# Patient Record
Sex: MALE | Race: BLACK OR AFRICAN AMERICAN | NOT HISPANIC OR LATINO | Employment: UNEMPLOYED | URBAN - METROPOLITAN AREA
[De-identification: names, ages, dates, MRNs, and addresses within clinical notes are randomized per-mention and may not be internally consistent; named-entity substitution may affect disease eponyms.]

---
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
## 2020-04-10 ENCOUNTER — APPOINTMENT (OUTPATIENT)
Dept: GENERAL RADIOLOGY | Facility: CLINIC | Age: 20
End: 2020-04-10
Attending: PHYSICIAN ASSISTANT

## 2020-04-10 ENCOUNTER — HOSPITAL ENCOUNTER (EMERGENCY)
Facility: CLINIC | Age: 20
Discharge: HOME | End: 2020-04-10
Attending: PHYSICIAN ASSISTANT | Admitting: PHYSICIAN ASSISTANT

## 2020-04-10 VITALS
BODY MASS INDEX: 29.35 KG/M2 | OXYGEN SATURATION: 100 % | DIASTOLIC BLOOD PRESSURE: 67 MMHG | RESPIRATION RATE: 18 BRPM | WEIGHT: 205 LBS | HEART RATE: 80 BPM | TEMPERATURE: 98.3 F | SYSTOLIC BLOOD PRESSURE: 124 MMHG | HEIGHT: 70 IN

## 2020-04-10 DIAGNOSIS — J98.01 BRONCHOSPASM: ICD-10-CM

## 2020-04-10 PROCEDURE — 71045 X-RAY EXAM CHEST 1 VIEW: CPT

## 2020-04-10 PROCEDURE — 99283 EMERGENCY DEPT VISIT LOW MDM: CPT | Mod: 25

## 2020-04-10 RX ORDER — ALBUTEROL SULFATE 90 UG/1
2 AEROSOL, METERED RESPIRATORY (INHALATION) EVERY 4 HOURS PRN
Qty: 1 INHALER | Refills: 0 | Status: ON HOLD | OUTPATIENT
Start: 2020-04-10 | End: 2024-07-20

## 2020-04-10 RX ORDER — PREDNISONE 20 MG/1
40 TABLET ORAL DAILY
Qty: 10 TABLET | Refills: 0 | Status: SHIPPED | OUTPATIENT
Start: 2020-04-10 | End: 2020-04-15

## 2020-04-10 ASSESSMENT — ENCOUNTER SYMPTOMS
PALPITATIONS: 0
CHILLS: 0
SORE THROAT: 0
WHEEZING: 1
FEVER: 0
VOICE CHANGE: 0
COUGH: 1
SHORTNESS OF BREATH: 1

## 2020-04-10 ASSESSMENT — MIFFLIN-ST. JEOR: SCORE: 1946.12

## 2020-04-10 NOTE — ED AVS SNAPSHOT
[facility] Emergency Department  [address]  [facility] [address]  Phone:  [phone]  Fax:  [phone]                                    Derik Croft   MRN: [id number]    Department:  [facility] Emergency Department   Date of Visit:  4/10/2020           After Visit Summary Signature Page    I have received my discharge instructions, and my questions have been answered. I have discussed any challenges I see with this plan with the nurse or doctor.    ..........................................................................................................................................  Patient/Patient Representative Signature      ..........................................................................................................................................  Patient Representative Print Name and Relationship to Patient    ..................................................               ................................................  Date                                   Time    ..........................................................................................................................................  Reviewed by Signature/Title    ...................................................              ..............................................  Date                                               Time          22EPIC Rev 08/18

## 2020-04-11 NOTE — ED PROVIDER NOTES
History     Chief Complaint:  Respiratory Problems      HPI   Derik Croft is a 20 year old male who presents with shortness of breath.  The patient states intermittent episodes of wheezing and difficulty breathing for the last week.  He states that these episodes last for several minute and appear to be somewhat intermittent.  He has been using his mother's albuterol inhaler which does seem to resolve the symptoms.  He notes that he has had a nonproductive cough for over a month.  Denies hemoptysis or recent international travel.  No fever or chills.  No sore throat, headache, or neck stiffness.  No known CO VID 19+ exposures.  He denies a history of asthma, but notes that he does smoke tobacco approximately 5 cigarettes daily.  Denies vaping.  No chest pain, leg swelling.  No vomiting or diarrhea.  He has not had any rashes, tongue or lip swelling, and has no history of allergic or anaphylactic reactions.  The episodes do not seem to come in relation to any significant trigger.    Allergies:  No Known Drug Allergies    Medications:    Adderall     Past Medical History:    Cerebral Palsy  Anxiety  Poor Coordination  ADHD  Mood Disorder  Vitamin D Deficiency  Obesity  Hearing Loss    Past Surgical History:    Tongue Cyst    Family History:    History reviewed. No pertinent family history.    Social History:  The patient was not accompanied to the ED  Smoking Status: Never  Smokeless Tobacco: Never  Alcohol Use: No  Marital Status:  Single    Review of Systems   Constitutional: Negative for chills and fever.   HENT: Negative for sore throat and voice change.    Respiratory: Positive for cough, shortness of breath and wheezing.    Cardiovascular: Negative for chest pain, palpitations and leg swelling.   All other systems reviewed and are negative.    Physical Exam     Patient Vitals for the past 24 hrs:   BP Temp Temp src Pulse Heart Rate Resp SpO2 Height Weight   04/10/20 2230 -- -- -- -- -- -- 100 % -- --  "  04/10/20 2215 124/67 -- -- 80 -- -- 100 % -- --   04/10/20 2200 123/72 -- -- 80 -- -- 100 % -- --   04/10/20 2145 -- -- -- 80 -- -- 99 % -- --   04/10/20 2126 (!) 141/89 98.3  F (36.8  C) Oral -- 105 18 98 % 1.778 m (5' 10\") 93 kg (205 lb)       Physical Exam  General: Alert and cooperative with exam. Resting comfortably on gurney  Head:  Scalp is NC/AT  Eyes:  No scleral icterus, PERRL  ENT:  The external nose and ears are normal.     The oropharynx is normal and without erythema or tonsillar swelling. Uvula midline, no submandibular swelling, sublingual swelling, trismus.  No angioedema of lips, tongue, or uvula.  Neck:  Normal range of motion without rigidity.  CV:  Regular rate and rhythm    No pathologic murmur, rubs, or gallops.  Resp:  Breath sounds are clear bilaterally.  No crackles, wheezes, rhonchi, stridor.    Non-labored, no retractions or accessory muscle use  GI:  Abdomen is soft, no distension, no tenderness, no masses. No peritoneal signs.  Bowel sounds present in all quadrants  MS:  No lower extremity edema or asymmetric calf swelling.  Skin:  Warm and dry, No rash or lesions noted.  Neuro: Oriented. No gross motor deficits.    GCS: 15.   Lymph: No cervical lymphadenopathy  Psych: Awake. Alert. Normal affect. Appropriate interactions.      Emergency Department Course   Imaging:  Radiology findings were communicated with the patient who voiced understanding of the findings.    XR Chest Port 1 View:  Negative chest.  Report per radiology     Procedures  None.    Emergency Department Course:  Past medical records, nursing notes, and vitals reviewed.    2134: I performed an exam of the patient as documented above.     The patient had a Chest XRay while in the emergency department, results above.     2225: I rechecked the patient and discussed the results of his workup thus far.     Findings and plan explained to the Patient. Patient discharged home with instructions regarding supportive care, " medications, and reasons to return. The importance of close follow-up was reviewed. The patient was prescribed Albuterol and Deltasone.    I personally reviewed the imaging results with the Patient and answered all related questions prior to discharge.     Impression & Plan   Medical Decision Makin-year-old male presents with intermittent episodes of shortness of breath and wheezing.  History and records reviewed.  Chest x-ray clear with no evidence of pneumonia, pneumothorax, or other acute abnormality.  His symptoms are most consistent with intermittent bronchospasm.   Likely  allergic versus post viral in nature and aggravated by tobacco use.   Vocal cord dysfunction and possibility but less likely.  No evidence of deep space infection of the head or neck.  Denies other symptoms to suggest anaphylactic reaction, and is asymptomatic at present.  Symptoms not consistent with CO VID 19 infection.  Very low suspicion for other cardiac or pulmonary etiologies such as ACS, PE, CHF, dissection etc.    Will prescribe albuterol, prednisone, urged tobacco cessation.  Follow-up with primary care provider if not improving as expected.  Strict return precautions given for increased difficulty breathing, development of swelling of the tongue or lips, neck stiffness, high fevers etc.    Diagnosis:    ICD-10-CM    1. Bronchospasm  J98.01        Disposition:  Discharged to home.    Discharge Medications:  New Prescriptions    ALBUTEROL (PROAIR HFA) 108 (90 BASE) MCG/ACT INHALER    Inhale 2 puffs into the lungs every 4 hours as needed for shortness of breath / dyspnea    PREDNISONE (DELTASONE) 20 MG TABLET    Take 2 tablets (40 mg) by mouth daily for 5 days       Scribe Disclosure:  INirali, am serving as a scribe at 9:34 PM on 4/10/2020 to document services personally performed by Henry Escalante, based on my observations and the provider's statements to me.     IAndreea, am serving as a scribe at  9:37 PM on 4/10/2020 to document services personally performed by Henry Escalante, based on my observations and the provider's statements to me.       Nirali Adam  4/10/2020   [facility] EMERGENCY DEPARTMENT       Henry Escalante PA-C  04/10/20 4800

## 2020-04-11 NOTE — ED TRIAGE NOTES
Pt presents for evaluation of difficulty swallowing, wheezing and difficulty breathing that has been intermittent for the last week. Pt feels like is throat is closing when it happens, like he cant breathe. Pt cant relate anything similar with every episode. Last episode happened 10 minutes PTA.

## 2020-04-15 ENCOUNTER — COMMUNICATION - HEALTHEAST (OUTPATIENT)
Dept: FAMILY MEDICINE | Facility: CLINIC | Age: 20
End: 2020-04-15

## 2020-04-15 DIAGNOSIS — J98.01 BRONCHOSPASM: ICD-10-CM

## 2020-04-20 ENCOUNTER — AMBULATORY - HEALTHEAST (OUTPATIENT)
Dept: FAMILY MEDICINE | Facility: CLINIC | Age: 20
End: 2020-04-20

## 2020-04-20 ENCOUNTER — COMMUNICATION - HEALTHEAST (OUTPATIENT)
Dept: FAMILY MEDICINE | Facility: CLINIC | Age: 20
End: 2020-04-20

## 2020-12-31 ENCOUNTER — OFFICE VISIT - HEALTHEAST (OUTPATIENT)
Dept: FAMILY MEDICINE | Facility: CLINIC | Age: 20
End: 2020-12-31

## 2020-12-31 DIAGNOSIS — F90.0 ATTENTION DEFICIT HYPERACTIVITY DISORDER (ADHD), PREDOMINANTLY INATTENTIVE TYPE: ICD-10-CM

## 2020-12-31 ASSESSMENT — ANXIETY QUESTIONNAIRES
GAD7 TOTAL SCORE: 0
4. TROUBLE RELAXING: NOT AT ALL
5. BEING SO RESTLESS THAT IT IS HARD TO SIT STILL: NOT AT ALL
6. BECOMING EASILY ANNOYED OR IRRITABLE: NOT AT ALL
7. FEELING AFRAID AS IF SOMETHING AWFUL MIGHT HAPPEN: NOT AT ALL
2. NOT BEING ABLE TO STOP OR CONTROL WORRYING: NOT AT ALL
3. WORRYING TOO MUCH ABOUT DIFFERENT THINGS: NOT AT ALL
IF YOU CHECKED OFF ANY PROBLEMS ON THIS QUESTIONNAIRE, HOW DIFFICULT HAVE THESE PROBLEMS MADE IT FOR YOU TO DO YOUR WORK, TAKE CARE OF THINGS AT HOME, OR GET ALONG WITH OTHER PEOPLE: NOT DIFFICULT AT ALL
1. FEELING NERVOUS, ANXIOUS, OR ON EDGE: NOT AT ALL

## 2020-12-31 ASSESSMENT — PATIENT HEALTH QUESTIONNAIRE - PHQ9: SUM OF ALL RESPONSES TO PHQ QUESTIONS 1-9: 1

## 2021-01-06 ENCOUNTER — COMMUNICATION - HEALTHEAST (OUTPATIENT)
Dept: FAMILY MEDICINE | Facility: CLINIC | Age: 21
End: 2021-01-06

## 2021-01-06 DIAGNOSIS — F90.2 ATTENTION DEFICIT HYPERACTIVITY DISORDER (ADHD), COMBINED TYPE: ICD-10-CM

## 2021-02-19 ENCOUNTER — COMMUNICATION - HEALTHEAST (OUTPATIENT)
Dept: FAMILY MEDICINE | Facility: CLINIC | Age: 21
End: 2021-02-19

## 2021-02-19 DIAGNOSIS — F90.2 ATTENTION DEFICIT HYPERACTIVITY DISORDER (ADHD), COMBINED TYPE: ICD-10-CM

## 2021-05-27 ASSESSMENT — PATIENT HEALTH QUESTIONNAIRE - PHQ9: SUM OF ALL RESPONSES TO PHQ QUESTIONS 1-9: 1

## 2021-05-27 NOTE — TELEPHONE ENCOUNTER
Controlled Substance Refill Request  Medication Name:   Requested Prescriptions     Pending Prescriptions Disp Refills     dextroamphetamine-amphetamine (ADDERALL) 10 mg Tab tablet 30 tablet 0     Sig: Take 1 tablet by mouth daily.     Date Last Fill: 3/4/2019  Pharmacy: [facility] [place]      Submit electronically to pharmacy  Controlled Substance Agreement Date Scanned:   Encounter-Level CSA Scan Date:    There are no encounter-level csa scan date.       Last office visit with prescriber/PCP: 3/4/2019 Abdulaziz Rodriguez MD OR same dept: 3/4/2019 Abdulaziz Rodriguez MD OR same specialty: 3/4/2019 Abdulaziz Rodriguez MD  Last physical: Visit date not found Last MTM visit: Visit date not found

## 2021-05-28 ASSESSMENT — ANXIETY QUESTIONNAIRES: GAD7 TOTAL SCORE: 0

## 2021-05-28 NOTE — PROGRESS NOTES
Chief Complaint   Patient presents with     Rash     Started last weekend. Very itchy. Mostly on arms and legs. Little on torso         HPI      Patient is here for about a week of moderate to severe itching rash on arms and legs, minimally on torso. No recent unusual contacts nor exposures. No fever, chills.    ROS: Pertinent ROS noted in HPI.     No Known Allergies    Patient Active Problem List   Diagnosis     Cerebral Palsy     Anxiety     Poor Coordination     ADHD (attention deficit hyperactivity disorder)     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     Vitamin D Deficiency       Family History   Problem Relation Age of Onset     Hyperthyroidism Mother      Hypothyroidism Maternal Grandmother      Hypertension Paternal Grandmother      Alcohol abuse Paternal Uncle        Social History     Socioeconomic History     Marital status: Single     Spouse name: Not on file     Number of children: Not on file     Years of education: Not on file     Highest education level: Not on file   Occupational History     Not on file   Social Needs     Financial resource strain: Not on file     Food insecurity:     Worry: Not on file     Inability: Not on file     Transportation needs:     Medical: Not on file     Non-medical: Not on file   Tobacco Use     Smoking status: Never Smoker     Smokeless tobacco: Never Used   Substance and Sexual Activity     Alcohol use: No     Drug use: No     Sexual activity: Never   Lifestyle     Physical activity:     Days per week: Not on file     Minutes per session: Not on file     Stress: Not on file   Relationships     Social connections:     Talks on phone: Not on file     Gets together: Not on file     Attends Religion service: Not on file     Active member of club or organization: Not on file     Attends meetings of clubs or organizations: Not on file     Relationship status: Not on file     Intimate partner violence:     Fear of current or ex partner: Not on file     Emotionally  abused: Not on file     Physically abused: Not on file     Forced sexual activity: Not on file   Other Topics Concern     Not on file   Social History Narrative     Not on file         Objective:    Vitals:    04/27/19 1257   BP: 112/64   Pulse: 80   Resp: 14   Temp: 98  F (36.7  C)   SpO2: 100%       Gen:NAD  CV: RRR  Pulm: CTAB  Skin:scattered papular eruptions on arms, hands. More intense similar lesions on thighs. No pustules no vesicles.         Rash and nonspecific skin eruption  -     permethrin (ELIMITE) 5 % cream; Massage cream from head to toes, leave on for 10 hours, then wash off with shower for one time treatment.  -     methylPREDNISolone (MEDROL DOSEPACK) 4 mg tablet; Follow package directions    Rash of unclear etiology. Will start one time Permethrin to cover scabies, and Medrol for intense itching. Advised f/u with primary care in a few days if no improvement.
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
## 2021-05-30 NOTE — TELEPHONE ENCOUNTER
Patient reported he has been out of his medications for one week.      Controlled Substance Refill Request  Medication Name:   Requested Prescriptions     Pending Prescriptions Disp Refills     dextroamphetamine-amphetamine (ADDERALL XR) 20 MG 24 hr capsule 30 capsule 0     Sig: Take 1 capsule (20 mg total) by mouth daily.     dextroamphetamine-amphetamine (ADDERALL) 10 mg Tab tablet 30 tablet 0     Sig: Take 1 tablet by mouth daily.     Date Last Fill:   Dextroamphetamine-amphetamine 20 mg 24 hr Capsule: 4/29/2019  Dextroamphetamine-amphetamine 10 mg Tablet: 4/11/2019  Pharmacy: [facility] [place]      Submit electronically to pharmacy  Controlled Substance Agreement Date Scanned:   Encounter-Level CSA Scan Date:    There are no encounter-level csa scan date.       Last office visit with prescriber/PCP: 4/29/2019 Abdulaziz Rodriguez MD OR same dept: 6/10/2019 Debbie Saravia CNP OR same specialty: 6/10/2019 Debbie Saravia CNP  Last physical: Visit date not found Last MTM visit: Visit date not found

## 2021-05-31 VITALS — BODY MASS INDEX: 29.12 KG/M2 | HEIGHT: 70 IN | WEIGHT: 203.4 LBS

## 2021-05-31 VITALS — WEIGHT: 227 LBS | BODY MASS INDEX: 33.04 KG/M2

## 2021-05-31 VITALS — BODY MASS INDEX: 29.77 KG/M2 | WEIGHT: 201 LBS | HEIGHT: 69 IN

## 2021-05-31 VITALS — WEIGHT: 230 LBS | BODY MASS INDEX: 33.48 KG/M2

## 2021-06-01 VITALS — WEIGHT: 221.1 LBS | BODY MASS INDEX: 32.18 KG/M2

## 2021-06-02 ENCOUNTER — RECORDS - HEALTHEAST (OUTPATIENT)
Dept: ADMINISTRATIVE | Facility: CLINIC | Age: 21
End: 2021-06-02

## 2021-06-02 VITALS — BODY MASS INDEX: 30.96 KG/M2 | WEIGHT: 212.7 LBS

## 2021-06-02 NOTE — TELEPHONE ENCOUNTER
Patient stated he would like his prescriptions sent to [facility] in [facility].    Controlled Substance Refill Request  Medication Name:   Requested Prescriptions     Pending Prescriptions Disp Refills     dextroamphetamine-amphetamine (ADDERALL XR) 20 MG 24 hr capsule 30 capsule 0     Sig: Take 1 capsule (20 mg total) by mouth daily.     Date Last Fill: 7/23/19  Pharmacy: [facility] [place]      Submit electronically to pharmacy  Controlled Substance Agreement Date Scanned:   Encounter-Level CSA Scan Date:    There are no encounter-level csa scan date.       Last office visit with prescriber/PCP: 4/29/2019 Abdulaziz Rodriguez MD OR same dept: 6/10/2019 Debbie Saravia CNP OR same specialty: 6/10/2019 Debbie Saravia CNP  Last physical: Visit date not found Last [facility] visit: Visit date not found

## 2021-06-03 VITALS — WEIGHT: 205.3 LBS | BODY MASS INDEX: 30.52 KG/M2 | BODY MASS INDEX: 29.88 KG/M2 | HEIGHT: 70 IN | WEIGHT: 213.2 LBS

## 2021-06-03 VITALS — HEIGHT: 70 IN | WEIGHT: 201.5 LBS | BODY MASS INDEX: 28.85 KG/M2

## 2021-06-03 NOTE — TELEPHONE ENCOUNTER
Medication Request  Medication name:  Permethrin -  creme  Pharmacy Name and Location: [id number] - [place], [place] - [address] AT [facility] [address]  [phone]  Reason for request:  Pt rash on shoulders are starting to come back    When did you use medication last ?: 2 months ago   Okay to leave a detailed message: yes         Summary: Massage cream from head to toes, leave on for 10 hours, then wash off with shower for one time treatment., Normal   Start: 4/27/2019  End: 6/10/2019  Ord/Sold: 4/27/2019 (O)  Report  Adh:   Long-term:   Pharmacy: [id number] [place], [place] - [address] AT [facility] [address]  Med Dose History       Patient Sig: Massage cream from head to toes, leave on for 10 hours, then wash off with shower for one time treatment.       Ordered on: 4/27/2019

## 2021-06-03 NOTE — TELEPHONE ENCOUNTER
Controlled Substance Refill Request  Medication:   Requested Prescriptions     Pending Prescriptions Disp Refills     dextroamphetamine-amphetamine (ADDERALL XR) 20 MG 24 hr capsule 30 capsule 0     Sig: Take 1 capsule (20 mg total) by mouth daily.     dextroamphetamine-amphetamine (ADDERALL) 10 mg Tab tablet 30 tablet 0     Sig: Take 1 tablet by mouth daily.     Date Last Fill: 10/17/19  Pharmacy:  [id number] [place] [address] AT [facility] [address]  [phone]      Submit electronically to pharmacy  Controlled Substance Agreement on File:   Encounter-Level CSA Scan Date:    There are no encounter-level csa scan date.       Last office visit: 4/29/2019 Abdulaziz Rodriguez MD

## 2021-06-07 NOTE — TELEPHONE ENCOUNTER
Who is calling:  Patient  Reason for Call:  Pt was seen in the ER Friday and was diagnosed with Bronchospasms.  Pt was given 10 tablets of Prednisone, and he took his last one today.  Pt has an upcoming appointment on Monday at 2 pm with his primary, but would like more pills due to still experiencing the spasms.  Pharmacy on file.  Please advise.  Date of last appointment with primary care: N/A  Okay to leave a detailed message: Yes

## 2021-06-11 NOTE — PROGRESS NOTES
Assessment/Plan:     1. Attention deficit hyperactivity disorder (ADHD), combined type  2. Anxiety  We will start low dose of medication risks benefits possible side effects discussed.  Continue to follow with therapist.  Recommend following up in 1 month with primary care provider for evaluation dose adjustment if needed.  Patient was seen today without his mother and so I did call her and get her consent to start treatment.  - dextroamphetamine-amphetamine (ADDERALL) 10 mg Tab tablet; Take 1 tablet by mouth daily.  Dispense: 30 tablet; Refill: 0    3. Dermatitis  On sure of cause a rash will try fungal cream as below call or return to care symptoms worsen or do not improve.  - ketoconazole (NIZORAL) 2 % cream; Apply topically 2 (two) times a day.  Dispense: 30 g; Refill: 1      Subjective:      Derik Croft is a 17 y.o. male comes in today requesting prescription for Adderall.  We had discussed at last visit.  He took as a child but has been off for several years.  More recently has been experimenting with his friends medication.  States he is unsure of the dose, perhaps 50 mg?  States he tolerated it well.  At last visit we discussed other alternative medications he was considering as his mother was against stimulants but states that he talked with her and at this point they are in agreement to restart.  He will continue to follow with counselor.  States anxiety is under control.  Also has a rash that has been on his neck for many years not spreading or changing but wants to see if there is anything he can do to make it go away.  No other new concerns today    Current Outpatient Prescriptions   Medication Sig Dispense Refill     dextroamphetamine-amphetamine (ADDERALL) 10 mg Tab tablet Take 1 tablet by mouth daily. 30 tablet 0     ketoconazole (NIZORAL) 2 % cream Apply topically 2 (two) times a day. 30 g 1     No current facility-administered medications for this visit.      No Known Allergies  Past Medical  "History, Family History, and Social History reviewed.  Past Medical History:   Diagnosis Date     ADHD (attention deficit hyperactivity disorder)      Anxiety      Cerebral palsy      Past Surgical History:   Procedure Laterality Date     tongue cyst       Review of patient's allergies indicates no known allergies.  Family History   Problem Relation Age of Onset     Hyperthyroidism Mother      Hypothyroidism Maternal Grandmother      Hypertension Paternal Grandmother      Alcohol abuse Paternal Uncle      Social History     Social History     Marital status: Single     Spouse name: N/A     Number of children: N/A     Years of education: N/A     Occupational History     Not on file.     Social History Main Topics     Smoking status: Never Smoker     Smokeless tobacco: Not on file     Alcohol use No     Drug use: No     Sexual activity: No     Other Topics Concern     Not on file     Social History Narrative         Review of systems is as stated in HPI, and the remainder of the 10 system review is otherwise negative.    Objective:     Vitals:    07/12/17 1016   BP: 110/60   Patient Site: Right Arm   Patient Position: Sitting   Cuff Size: Adult Large   Pulse: 88   Weight: 203 lb 6.4 oz (92.3 kg)   Height: 5' 9.5\" (1.765 m)    Body mass index is 29.61 kg/(m^2).  Wt Readings from Last 3 Encounters:   07/12/17 203 lb 6.4 oz (92.3 kg) (96 %, Z= 1.74)*   06/13/17 201 lb (91.2 kg) (96 %, Z= 1.70)*   08/03/16 195 lb (88.5 kg) (96 %, Z= 1.75)*     * Growth percentiles are based on CDC 2-20 Years data.       General Appearance:    Alert, cooperative, no distress, appears stated age    Head:    Normocephalic, without obvious abnormality, atraumatic   Eyes:    PERRL,no conjunctivitis    Ears:    Normal  external ear canals   Nose:   Mucosa normal, no drainage        Throat:   Oropharynx is clear   Neck:   Supple, symmetrical, no adenopathy, no thyromegally, no carotid bruit        Lungs:     Clear to auscultation bilaterally, " respirations unlabored   Chest Wall:    No tenderness or deformity    Heart:    Regular rate and rhythm, S1 and S2 normal, no murmur, rub    or gallop       Abdomen:     Soft, non-tender, bowel sounds active all four quadrants,     no masses, no organomegaly           Extremities:   Extremities normal, atraumatic, no cyanosis or edema   Pulses:   2+ and symmetric all extremities   Neuro:   cranial nerves grossly intact, grossly normal sensation and reflexes in extremities    Psych:   grossly normal mood and affect without acute anxiety or psychosis    Skin:  On top of shoulders there are several scattered areas of hyperpigmentation slightly rough no open lesions.  Otherwise no rashes or lesions   :          This note has been dictated using voice recognition software. Any grammatical or context distortions are unintentional and inherent to the software.

## 2021-06-11 NOTE — PROGRESS NOTES
Assessment/Plan:     1. Screen for STD (sexually transmitted disease)  Screening ordered as below.  Patient currently asymptomatic.  Recommended continued safe sex practices  - Chlamydia trachomatis & Neisseria gonorrhoeae, Amplified Detection  - HIV Antigen/Antibody Screening [place]  - Syphilis Screen, [place]    2. Anxiety  3. Attention deficit hyperactivity disorder (ADHD), combined type  [place] various options.  Patient will continue to follow with psychology.  States mother is against him taking medications at this point.  Was given information about a few medications.  He is interested in looking at something like Wellbutrin possibly buspirone.  He will discuss with her and let us know if they change their mind and plan to send in prescription.      Total time spent was 45 minutes, >50% spent discussing treatment options noted above, counseling and coordination of care.     Subjective:      Derik Croft is a 17 y.o. male comes in today with a few concerns.  First he would like STD screening.  He had new sexual partners did use protection but just wants to make sure that he does not have any pain.  He is particularly concerned about HIV.  He has no itching burning discharge or other concerns.  He also wants to discuss medications for mental health.  He has a history of being diagnosed as ADHD as well as anxiety and depression.  He tells me that he sees a therapist and they told him that perhaps he should be on medication for depression.  He is actually in a good mood today states that his moods have increased over the last couple days and school was out.  He states that he typically does very well in school but that last trimester he failed nearly all of his classes and has to go to summer school.  He states he just was not motivated to study.  No significant changes in his life to cause the change in motivation.  He states that he is very anxious about the future that he will get into college.  He states  that he previously took Adderall when he was younger but his mom does not want him on any medications particularly not on a stimulant like Adderall.  He states that he did try Adderall from his friend for a week and it did seem to help but he would like to stay off of that medication if possible wonders if there is something that can sometimes help with a few of his symptoms.  His mom is not in the room with him but she did consent us to see him alone and she is sitting in the lobby.  He states he does not know if she will allow him to be on any medications and he wants to follow her wishes as he is still 17 but he wants to take some literature home to discuss with her.  No other new concerns today.    No current outpatient prescriptions on file.     No current facility-administered medications for this visit.        Past Medical History, Family History, and Social History reviewed.  Past Medical History:   Diagnosis Date     ADHD (attention deficit hyperactivity disorder)      Anxiety      Cerebral palsy      Past Surgical History:   Procedure Laterality Date     tongue cyst       Review of patient's allergies indicates no known allergies.  Family History   Problem Relation Age of Onset     Hyperthyroidism Mother      Hypothyroidism Maternal Grandmother      Hypertension Paternal Grandmother      Alcohol abuse Paternal Uncle      Social History     Social History     Marital status: Single     Spouse name: N/A     Number of children: N/A     Years of education: N/A     Occupational History     Not on file.     Social History Main Topics     Smoking status: Never Smoker     Smokeless tobacco: Not on file     Alcohol use No     Drug use: No     Sexual activity: No     Other Topics Concern     Not on file     Social History Narrative         Review of systems is as stated in HPI, and the remainder of the 10 system review is otherwise negative.    Objective:     Vitals:    06/13/17 1057   BP: 116/70   Pulse: 79   SpO2:  "99%   Weight: 201 lb (91.2 kg)   Height: 5' 9\" (1.753 m)    Body mass index is 29.68 kg/(m^2).    General Appearance:    Alert, cooperative, no distress, appears stated age   Head:    Normocephalic, without obvious abnormality, atraumatic   Eyes:    PERRL   Ears:    Normal external ear canals   Nose:   Mucosa normal, no drainage        Throat:   Oropharynx is clear   Neck:   Supple, symmetrical, no adenopathy                                   Extremities:   Extremities normal, atraumatic, no cyanosis or edema   Psych:  normal mood and affect without acute anxiety or psychosis   Skin:   No rashes or lesions         This note has been dictated using voice recognition software. Any grammatical or context distortions are unintentional and inherent to the the software.   "

## 2021-06-13 NOTE — PROGRESS NOTES
Assessment:     1. Viral URI     2. Urine frequency  Urinalysis-UC if Indicated   3. Fever  Rapid Strep A Screen-Throat    Influenza A/B Rapid Test    Group A Strep, RNA Direct Detection, Throat          Plan:   Discussed the typical course of a viral upper respiratory infection.  No antibiotics indicated at this time.  Recommend symptomatic treatment such as decongestants and acetominephen or ibuprofen as needed.  Recommend follow up if getting worse or not improving.    Does seem to be mildly dehydrated.  Recommend pushing fluids--Gatorade.    Unclear as to the etiology of his increased urinary frequency.  No evidence of infection.  Recommend that he push fluids and follow-up if his symptoms are getting worse or not continuing to improve.      Subjective:       17 y.o. male presents for evaluation of a four-day history of fatigue low-grade fever and sore throat.  Starting yesterday he started having some body aches and started coughing yesterday as well.  He has been having some nasal congestion in addition.  He is also been having some headaches off and on.  His appetite is been poor.  He missed school today.  He has not been short of breath.  He denies any abdominal pain.  His stools have been a bit more loose than usual and his mom is noticed that he has been going the bathroom more frequently.  He states he does not drink much water.  He has taken some allergy medication and Benadryl as well as Sudafed over the past week and this has not helped much for his symptoms.    The following portions of the patient's history were reviewed and updated as appropriate: allergies, current medications, past family history, past medical history, past social history, past surgical history and problem list.    Review of Systems  A 12 point comprehensive review of systems was negative except as noted.     Objective:      /64  Pulse 116  Temp 98.3  F (36.8  C) (Oral)   Resp 16  Wt (!) 230 lb (104.3 kg)  SpO2  96%  General appearance: alert, appears stated age and cooperative  Eyes: conjunctivae/corneas clear. PERRL, EOM's intact. Fundi benign.  Ears: normal TM's and external ear canals both ears  Throat: lips, mucosa, and tongue normal; teeth and gums normal, his membranes tacky.  Neck: no adenopathy  Lungs: clear to auscultation bilaterally  Heart: regular rate and rhythm, S1, S2 normal, no murmur, click, rub or gallop  Abdomen: soft, non-tender; bowel sounds normal; no masses,  no organomegaly  Extremities: extremities normal, atraumatic, no cyanosis or edema  Skin: Skin color, texture, turgor normal. No rashes or lesions     Recent Results (from the past 24 hour(s))   Rapid Strep A Screen-Throat   Result Value Ref Range    Rapid Strep A Antigen No Group A Strep detected, presumptive negative No Group A Strep detected, presumptive negative   Influenza A/B Rapid Test   Result Value Ref Range    Influenza  A, Rapid Antigen No Influenza A antigen detected No Influenza A antigen detected    Influenza B, Rapid Antigen No Influenza B antigen detected No Influenza B antigen detected   Urinalysis-UC if Indicated   Result Value Ref Range    Color, UA Yellow Colorless, Yellow, Straw, Light Yellow    Clarity, UA Clear Clear    Glucose, UA Negative Negative    Bilirubin, UA Negative Negative    Ketones, UA Negative Negative    Specific Gravity, UA 1.020 1.005 - 1.030    Blood, UA Negative Negative    pH, UA 6.0 5.0 - 8.0    Protein, UA Negative Negative mg/dL    Urobilinogen, UA 0.2 E.U./dL 0.2 E.U./dL, 1.0 E.U./dL    Nitrite, UA Negative Negative    Leukocytes, UA Negative Negative         This note has been dictated using voice recognition software. Any grammatical or context distortions are unintentional and inherent to the software

## 2021-06-14 NOTE — TELEPHONE ENCOUNTER
Reason contacted:  Medication question   Information relayed:    Patient completed a virtual visit with Dr. Mario on 12/31/2020.   Patient states Dr. Mario was going to speak with Dr. Rodriguez in regards to restarting his Adderall and the patient was to hear back on Monday.     It is now Wednesday and the patient has not heard anything from Dr. Rodriguez or Dr. Mario in regards to restarting Adderall and is wondering what he needs to do in regards to this?     Per 12/31/2020 virtual visit:   Additional provider notes: This a telephone visit conducted between the patient and myself.  He is a 20-year-old who plans on going back to school at [employer] in 2 weeks and restarting a new semester.  He has a past medical history of ADHD primarily the inattentive type and apparently Adderall has been prescribed in the past with success for him.  I have read to get his past medical records and noted that he has had some difficult times.  I pointed out to the patient that its been over a year since Dr. Rodriguez he has seen him and that he may require a face-to-face exam just to see how he is doing or because they know each other so well he may elect to represcribe the Adderall. he had an appropriate long-acting dose.  I note that a year and a half ago he was on 20 mg of extended release Adderall.  Patient was very reasonable and is willing to wait until sometime next week to touch base with Dr. Rodriguez.  I did not fill the medication today     Assessment/Plan:  1. Attention deficit hyperactivity disorder (ADHD), predominantly inattentive type  Patient will call Dr. Rodriguez after he has had a chance to read this note and they can discuss whether they need a face-to-face visit or not       Please advise   Additional questions:  No  Further follow-up needed:  Yes  Okay to leave a detailed message:  Yes

## 2021-06-14 NOTE — TELEPHONE ENCOUNTER
Please inform patient I have sent a refill of his medication.  I would like him to arrange a follow-up phone visit with me for 1 month from now to assess his response to restarting the medication.

## 2021-06-14 NOTE — TELEPHONE ENCOUNTER
Reason contacted:  Medication question  Information relayed:  Below message. Helped arrange a follow-up visit with Dr. Rodriguez   Additional questions:  No  Further follow-up needed:  No  Okay to leave a detailed message:  No

## 2021-06-14 NOTE — PROGRESS NOTES
"Derik Croft is a 20 y.o. male who is being evaluated via a billable telephone visit.      The patient has been notified of following:     \"This telephone visit will be conducted via a call between you and your physician/provider. We have found that certain health care needs can be provided without the need for a physical exam.  This service lets us provide the care you need with a short phone conversation.  If a prescription is necessary we can send it directly to your pharmacy.  If lab work is needed we can place an order for that and you can then stop by our lab to have the test done at a later time.    Telephone visits are billed at different rates depending on your insurance coverage. During this emergency period, for some insurers they may be billed the same as an in-person visit.  Please reach out to your insurance provider with any questions.    If during the course of the call the physician/provider feels a telephone visit is not appropriate, you will not be charged for this service.\"    Patient has given verbal consent to a Telephone visit? Yes    What phone number would you like to be contacted at? [phone]    Additional provider notes: This a telephone visit conducted between the patient and myself.  He is a 20-year-old who plans on going back to school at [employer] in 2 weeks and restarting a new semester.  He has a past medical history of ADHD primarily the inattentive type and apparently Adderall has been prescribed in the past with success for him.  I have read to get his past medical records and noted that he has had some difficult times.  I pointed out to the patient that its been over a year since Dr. Rodriguez he has seen him and that he may require a face-to-face exam just to see how he is doing or because they know each other so well he may elect to represcribe the Adderall. he had an appropriate long-acting dose.  I note that a year and a half ago he was on 20 mg of extended " release Adderall.  Patient was very reasonable and is willing to wait until sometime next week to touch base with Dr. Rodriguez.  I did not fill the medication today    Assessment/Plan:  1. Attention deficit hyperactivity disorder (ADHD), predominantly inattentive type  Patient will call Dr. Rodriguez after he has had a chance to read this note and they can discuss whether they need a face-to-face visit or not        Phone call duration:  11 minutes    Katy Valdez, [facility]

## 2021-06-15 NOTE — PROGRESS NOTES
Patient ID: Derik Croft is a 17 y.o. male.  /76  Pulse 82  Wt (!) 227 lb (103 kg)  SpO2 98%    Assessment/Plan:                   Diagnoses and all orders for this visit:    Major depression    Vitamin D deficiency  -     Vitamin D, Total (25-Hydroxy)    Cerebral Palsy  -     Ambulatory referral to Occupational Medicine  -     Amb referral to Speech Therapy- External    Anxiety    Attention deficit hyperactivity disorder (ADHD), combined type    Family history of celiac disease  -     Celiac(Gluten)Antibody Panel ($$$)    Fatigue  -     HM2(CBC w/o Differential)  -     Thyroid [place]    Other orders  -     citalopram (CELEXA) 10 MG tablet; Take 1 tablet (10 mg total) by mouth daily.  Dispense: 30 tablet; Refill: 2      DISCUSSION  The labs as above to evaluate for potential medical concerns.  Mother has significant anemia related to celiac disease.  Discussed evaluating all of these concerns as noted.    Will not treat attention deficit disorder specifically.  Continue to work on academic skills in this regard.  We will treat anxiety depression symptoms with the seasonal affective component more specifically with citalopram beginning with 10 mg.  We will arrange for short-term follow-up to readjust medications and consider additional options.  As mentioned below they have plan follow-up with cognitive behavioral therapist as well as to address the marijuana use.  He does agree that using marijuana is detrimental and he does not plan to use it any further.  Subjective:     HPI    Derik Croft is a 17 y.o. male who is here today with his mother to discuss multiple issues.  He recently presented to the emergency department feeling overwhelmed as though he may harm himself.  He was evaluated and staff was reassured that he was not an imminent danger to himself and recommended he have outpatient follow-up.  He reports he is having a lot of difficulty in school.  He just cannot get his work done.  He does  have an IEP secondary to a diagnosis of attention deficit disorder.  Mother reports that numerous resources are available to him for this.  He does get extra time on tests and to do work.  Generally he is not doing very well in school currently.  He does have a .  They are working closely with the school to try and straighten out his academic concerns.  He does feel that he is overwhelmed much of the time.  He does feel down often and feels anxious.  He does admit to marijuana use.  He does state that he has had a realization that this may be having a much more negative impact and he realized.  He does not feel he has a physiologic dependence on marijuana but feels it is enjoyable to use as it does tend to alleviate anxiety for him.  He has sought evaluation and is starting a treatment program at [facility] and [facility].  His mother has also in the process of making arrangements for him to be seen by a cognitive behavioral therapist for his anxiety and depression symptoms there as well.  It is reported in addition to just general anxiety depression he seems to have historically a seasonal component to his behavior in which she feels more down and tends to sleep much more often during the winter months.  His mother has noticed this in particular this winter.  Patient cannot pinpoint exactly why he feels anxious.  Patient also as stated has a diagnosis of attention deficit disorder.  He has previously been on stimulant medication.  It is reported that stimulant medication cause significant side effects and they do not want to ever consider this in the future.  He does state that he is willing to consider an antidepressant/antianxiety type of medication.    He is a history of cerebral palsy.  Mother states that 2 mild extent this has affected his ambulation and speech.  He is generally function rather normally but his mother feels his speech may be suffering more and that he may be having more difficulty  with his walking.  The symptoms brought up are somewhat subtle and we discussed return to speech therapy and Occupational Therapy for further urination and consideration of treatment.    Has gained a significant amount of weight over the past 2 years.  Reasons for this are unclear.  Discussed laboratory evaluation, reviewed nutrition and exercise considerations.    Is a significant vitamin D deficiency does not take her current supplement.  Needs follow-up evaluation.  Review of Systems          Objective:   Medications:  Current Outpatient Prescriptions   Medication Sig     ascorbic acid, vitamin C, (VITAMIN C) 1000 MG tablet Take 1,000 mg by mouth daily.     citalopram (CELEXA) 10 MG tablet Take 1 tablet (10 mg total) by mouth daily.     ketoconazole (NIZORAL) 2 % cream Apply topically 2 (two) times a day.       Allergies:  No Known Allergies    Tobacco:   reports that he has never smoked. He has never used smokeless tobacco.     Physical Exam          /76  Pulse 82  Wt (!) 227 lb (103 kg)  SpO2 98%      General Appearance:    Alert, cooperative, no distress   Eyes:    No conjunctival irritation, no scleral icterus       Extremities:   Extremities normal, atraumatic, no cyanosis or edema   Skin:   Skin color, texture, turgor normal, no rashes or lesions   Neurologic:   Normal strength and sensation

## 2021-06-19 NOTE — LETTER
Letter by Debbie Saravia CNP at      Author: Debbie Saravia CNP Service: -- Author Type: --    Filed:  Encounter Date: 6/10/2019 Status: (Other)         Zohra 10, 2019     Patient: Derik Croft   YOB: 2000   Date of Visit: 6/10/2019       To Whom it May Concern:    Derik Croft was seen in my clinic on 6/10/2019.    If you have any questions or concerns, please don't hesitate to call.    Sincerely,         Electronically signed by Debbie Saravia CNP

## 2021-06-19 NOTE — LETTER
Letter by Abdulaziz Rodriguez MD at      Author: Abdulaziz Rodriguez MD Service: -- Author Type: --    Filed:  Encounter Date: 4/29/2019 Status: (Other)         April 29, 2019     Patient: Derik Croft   YOB: 2000   Date of Visit: 4/29/2019       To Whom it May Concern:    Derik Croft was seen in my clinic on 4/29/2019.    If you have any questions or concerns, please don't hesitate to call.    Sincerely,         Electronically signed by Abdulaziz Rodriguez MD

## 2021-06-19 NOTE — PROGRESS NOTES
Subjective:   Derik Croft is a 18 y.o. male  Roomed by: Yue Cardoso    Refills needed? No    Do you have any forms that need to be filled out? No      Chief Complaint   Patient presents with     poss cramps     Happen all at once just this past weekend. Today is Monday.      Headache     Nausea   On 7/13 started getting a headache in the afternoon. Then vomited and started having stomach cramps. Then 7/14 headache got worse with dizziness. Stayed in bed all day and stayed in day. Says he felt weak and he was eating or drinking much. Then yesterday headache and energy level improved a little. Denies any any CP, coughing or shortness of breath. Denies any vision problems. Says today he feels sick to his stomach and having stomach pain. Last BM was this morning. Has has diarrhea yesterday and today. Admits that patient has been eating and drinking less, but has been urinating normally.  Denies any recent belly pain, vomiting or diarrhea. Admits a runny nose, but no sore throat.     Review of Systems  See HPI for ROS, otherwise all other systems negative    No Known Allergies    Current Outpatient Prescriptions:      ascorbic acid, vitamin C, (VITAMIN C) 1000 MG tablet, Take 1,000 mg by mouth daily., Disp: , Rfl:      cholecalciferol, vitamin D3, (VITAMIN D3) 2,000 unit Tab, Take 1 tablet (2,000 Units total) by mouth daily., Disp: 90 tablet, Rfl: 3     citalopram (CELEXA) 10 MG tablet, Take 1 tablet (10 mg total) by mouth daily., Disp: 30 tablet, Rfl: 2     ketoconazole (NIZORAL) 2 % cream, Apply topically 2 (two) times a day., Disp: 30 g, Rfl: 1  Patient Active Problem List   Diagnosis     Cerebral Palsy     Anxiety     Poor Coordination     ADHD (attention deficit hyperactivity disorder)     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     Vitamin D Deficiency     Past Medical History Reviewed  Objective:     Vitals:    07/16/18 1818 07/16/18 1821   BP: (!) 90/60 110/70   Patient Site: Right Arm Right Arm    Patient Position: Sitting Sitting   Cuff Size: Adult Large Adult Large   Pulse: 78    Resp: 20    Temp: 98.1  F (36.7  C)    TempSrc: Oral    SpO2: 97%    Weight: (!) 221 lb 1.6 oz (100.3 kg)    Gen - Pt in NAD  Eyes - Conjunctiva non injected, no drainage  Face - non TTP over frontal sinus areas; non TTP over maxillary areas  Ears - external canals - no induration, Right TM - not injected, Left TM - not injected   Nose - not congested, no nasal drainage  Pharynx - not injected, tonsils 1+ size  Neck - supple, no cervical adenopathy, no masses  Cor - RRR w/o murmur  Lungs - Good air entry, no wheezes or crackles noted  Abdomen: Flat, soft, normal BS, no HSM or masses, no rebound or guarding  Skin - no lesions, no rashes noted  .    Assessment - Plan   Medical Decision Making - No clinical findings indicative of bacterial infection requiring antibiotics, such as pneumonia, sinusitis or otitis media were ascertained from today's evaluation. Presentation and clinical findings are consistent with a viral process.  See patient instructions.    1. Viral gastroenteritis    At the conclusion of the encounter, assessment and plan were discussed.   All questions were answered.   The patient or guardian acknowledged understanding and was involved in the decision making regarding the overall care plan.    Patient Instructions   1. Keep well hydrated  2. May alternate Tylenol every 6 hours with ibuprofen every 6 hours as needed for fever or pain  3. If symptoms are not improving over the next 5-7 days, follow up with primary provider  4. If you have any questions, call the clinic number  - it's answered 24/7    Viral Gastroenteritis    Patient information: Viral gastroenteritis or a stomach virus ( It should not be called the stomach flu, because it is not the flu.   What is viral gastroenteritis? -- Viral gastroenteritis is an infection that can cause diarrhea and vomiting. It happens when a person s stomach and intestines  get infected with a virus . Both adults and children can get viral gastroenteritis.  People can get the infection if they:  ?Touch an infected person or a surface with the virus on it, and then don t wash their hands  ?Eat foods or drink liquids with the virus in them. If people with the virus don t wash their hands, they can spread it to food or liquids they touch.  What are the symptoms of viral gastroenteritis? -- The infection causes diarrhea and vomiting. People can have either diarrhea or vomiting, or both. These symptoms usually start suddenly, and can be severe.  Viral gastroenteritis can also cause:  ?A fever  ?A headache or muscle aches  ?Belly pain or cramping  ?A loss of appetite  If you have diarrhea and vomiting, your body can lose too much water. Doctors call this  dehydration.  Dehydration can make you have dark yellow urine and feel thirsty, tired, dizzy, or confused.  Severe dehydration can be life-threatening. Babies, young children, and elderly people are more likely to get severe dehydration.  Do people with viral gastroenteritis need tests? -- Not usually. Their doctor or nurse should be able to tell if they have it by learning about their symptoms and doing an exam. But the doctor or nurse might do tests to check for dehydration or to see which virus is causing the infection. These tests can include:  ?Blood tests  ?Urine tests  ?Tests on a sample of bowel movement  Is there anything I can do on my own to feel better or help my child? -- Yes. People with viral gastroenteritis need to drink enough fluids so they don t get dehydrated.  Some fluids help prevent dehydration better than others:  ?Older children and adults can drink sports drinks.   People with viral gastroenteritis should avoid drinking juice or soda. These can make diarrhea worse.   If you can keep food down, it s best to eat lean meats, fruits, vegetables, and whole-grain breads and cereals. Avoid eating foods with a lot of fat  or sugar, which can make symptoms worse.  Do NOT give medicines to stop diarrhea to children.  Should I call the doctor or nurse? -- Call the doctor or nurse if you or your child:  ?Has any symptoms of dehydration  ?Has diarrhea or vomiting that lasts longer than a few days  ?Vomits up blood, has bloody diarrhea, or has severe belly pain  ?Hasn t had anything to drink in a few hours (for children), or in many hours (for adults)  ?Hasn t needed to urinate in the past 6 to 8 hours (during the day), or if your baby or young child hasn t had a wet diaper for 4 to 6 hours  How is viral gastroenteritis treated? -- Most people do not need any treatment, because their symptoms will get better on their own. But people with severe dehydration might need treatment in the hospital for their dehydration. This involves getting fluids through an  IV  (a thin tube that goes into the vein).  Doctors do not treat viral gastroenteritis with antibiotics. That s because antibiotics treat infections that are caused by bacteria - not viruses.  Can viral gastroenteritis be prevented? -- Sometimes. To lower the chance of getting or spreading the infection, you can:  ?Wash your hands with soap and water after you use the bathroom or change your child s diaper, and before you eat.  ?Avoid changing your child s diaper near where you prepare food.  ?Make sure your baby gets the rotavirus vaccine. Vaccines are treatments that can prevent serious infections. Rotavirus is a virus that commonly causes viral gastroenteritis in children.

## 2021-06-24 NOTE — TELEPHONE ENCOUNTER
Called and left message.  Negative results  Reminder to f/up in 1 month.    Please relay results if patient calls back.    Thank you.

## 2021-06-24 NOTE — TELEPHONE ENCOUNTER
----- Message from Erika Ribera CMA sent at 3/8/2019  8:10 AM CST -----      ----- Message -----  From: Abdulaziz Rodriguez MD  Sent: 3/7/2019   8:55 PM  To: Abdulaziz Rodriguez Care Team Pool    Please inform patient that all of his tests for infection were negative, there is no sign of infection.

## 2021-06-24 NOTE — PROGRESS NOTES
Patient ID: Derik Croft is a 19 y.o. male.  /74   Pulse 79   Wt 212 lb 11.2 oz (96.5 kg)   SpO2 99%     Assessment/Plan:                   Diagnoses and all orders for this visit:    Screen for STD (sexually transmitted disease)  -     HIV Antigen/Antibody Screening [place]  -     Syphilis Screen, [facility]  -     Chlamydia trachomatis & Neisseria gonorrhoeae, Amplified Detection    Attention deficit hyperactivity disorder (ADHD), combined type  -     dextroamphetamine-amphetamine (ADDERALL) 10 mg Tab tablet  Dispense: 30 tablet; Refill: 0    Skin lesion        DISCUSSION  Initiate treatment for attention deficit hyperactivity disorder combined type with Adderall 10 mg once daily.  Follow-up with me in 1 month to reassess.    STD screening as noted above.    High probability of skin lesions of genital warts, given that the onset shortly after shaving we will try short course of a topical corticosteroid to see if they resolve as it is possible that he may be more of a folliculitis although given their appearance this seems less likely.  When he returns in 1 month for follow-up on ADHD we will reassess and if still present treat we discussed this in depth.  Subjective:     HPI    Derik Croft is a 19 y.o. male is here today to discuss difficulties with concentration in school.  He has a long-standing diagnosis of attention deficit hyperactivity disorder.  He has been on medication treatment as recent as 2017.  He reports he has done well with this treatment but felt he did not need it more recently.  He is currently in school at [employer] where he is studying business.  He reports he is overall doing well but feels he could be much better.  He reports that his attention drifts often during class which creates difficulties.  He has difficulty being able to sit down and focus to study.  He would like to consider return to low-dose stimulant medication as he had been on prior.  We had a significant  discussion regarding this today.  Patient denies any THC use which have been mentioned in the past.  He does not use any other illicit substances.  We did discussion about the importance of appropriate use of such medication.  We discussed his mental health otherwise.  In the past had been on medication for treatment of depression.  He does fill out a PHQ 9 his score today is 8 but he states he feels well and does not feel he needs any depression symptoms addressed more specifically.  He denies any significant anxiety reporting only typical family and other life stressors.    He does report he would like to have STD screening.  Has noted skin lesions on the genital region recently.    Review of Systems  Complete review of systems is obtained.  Other than the specific considerations noted above complete review of systems is negative.        Objective:   Medications:  Current Outpatient Medications   Medication Sig     ascorbic acid, vitamin C, (VITAMIN C) 1000 MG tablet Take 1,000 mg by mouth daily.     cholecalciferol, vitamin D3, (VITAMIN D3) 2,000 unit Tab Take 1 tablet (2,000 Units total) by mouth daily.     citalopram (CELEXA) 10 MG tablet Take 1 tablet (10 mg total) by mouth daily.     dextroamphetamine-amphetamine (ADDERALL) 10 mg Tab tablet Take 1 tablet by mouth daily.     ketoconazole (NIZORAL) 2 % cream Apply topically 2 (two) times a day.     Allergies:  No Known Allergies    Tobacco:   reports that  has never smoked. he has never used smokeless tobacco.     Physical Exam      /74   Pulse 79   Wt 212 lb 11.2 oz (96.5 kg)   SpO2 99%     General: Patient alert no signs of distress his affect is appropriate.    Skin: On the dorsal shaft of the penis more proximally there is a linear area of several raised skin colored papules.  There are a few scattered papules in the pubic region as well.

## 2021-08-01 ENCOUNTER — HEALTH MAINTENANCE LETTER (OUTPATIENT)
Age: 21
End: 2021-08-01

## 2021-09-26 ENCOUNTER — HEALTH MAINTENANCE LETTER (OUTPATIENT)
Age: 21
End: 2021-09-26

## 2022-02-15 ENCOUNTER — VIRTUAL VISIT (OUTPATIENT)
Dept: FAMILY MEDICINE | Facility: CLINIC | Age: 22
End: 2022-02-15

## 2022-02-15 DIAGNOSIS — F90.2 ATTENTION DEFICIT HYPERACTIVITY DISORDER (ADHD), COMBINED TYPE: Primary | ICD-10-CM

## 2022-02-15 PROCEDURE — 99213 OFFICE O/P EST LOW 20 MIN: CPT | Mod: 95 | Performed by: FAMILY MEDICINE

## 2022-02-15 RX ORDER — DEXTROAMPHETAMINE SACCHARATE, AMPHETAMINE ASPARTATE MONOHYDRATE, DEXTROAMPHETAMINE SULFATE AND AMPHETAMINE SULFATE 5; 5; 5; 5 MG/1; MG/1; MG/1; MG/1
20 CAPSULE, EXTENDED RELEASE ORAL
COMMUNITY
Start: 2021-02-19 | End: 2022-02-15

## 2022-02-15 RX ORDER — DEXTROAMPHETAMINE SACCHARATE, AMPHETAMINE ASPARTATE MONOHYDRATE, DEXTROAMPHETAMINE SULFATE AND AMPHETAMINE SULFATE 5; 5; 5; 5 MG/1; MG/1; MG/1; MG/1
20 CAPSULE, EXTENDED RELEASE ORAL DAILY
Qty: 30 CAPSULE | Refills: 0 | Status: SHIPPED | OUTPATIENT
Start: 2022-02-15 | End: 2022-02-24

## 2022-02-15 RX ORDER — DEXTROAMPHETAMINE SACCHARATE, AMPHETAMINE ASPARTATE MONOHYDRATE, DEXTROAMPHETAMINE SULFATE AND AMPHETAMINE SULFATE 5; 5; 5; 5 MG/1; MG/1; MG/1; MG/1
20 CAPSULE, EXTENDED RELEASE ORAL
OUTPATIENT
Start: 2022-02-15

## 2022-02-15 NOTE — TELEPHONE ENCOUNTER
I would like him to have a visit before restarting the medication. I have not seen him in nearly 3 years. A virtual visit is a possibility including even later today if desired.

## 2022-02-15 NOTE — PROGRESS NOTES
Derik is a 22 year old who is being evaluated via a billable telephone visit.      What phone number would you like to be contacted at? [phone]  How would you like to obtain your AVS? Any More was seen today for a.d.h.d.    Diagnoses and all orders for this visit:    Attention deficit hyperactivity disorder (ADHD), combined type  -     amphetamine-dextroamphetamine (ADDERALL XR) 20 MG 24 hr capsule; Take 1 capsule (20 mg) by mouth daily           Subjective   Derik is a 22 year old has a history of attention deficit disorder primarily inattentive type.  He has benefited from use of stimulant medication for school.  Patient is return to school.  He has been off of the medication more recently because he has not been attending school.  He is currently completing his general studies and hopes to transfer to [employer] possibly to go into business or medicine.  He reports in the past he has benefited from the medication without causing significant side effects.  There is no history of misuse or diversion of the medication.  I have managed his medication treatment for this concern in the past.  We discussed returning to medication therapy for its benefits.  Patient notes he had a daughter born in the past few months.  Recommend follow-up every 6 months.        Review of Systems   Complete review of systems is obtained.  Other than the specific considerations noted above complete review of systems is negative.          Objective           Vitals:  No vitals were obtained today due to virtual visit.    Physical Exam   healthy, alert and no distress  PSYCH: Alert and oriented times 3; coherent speech, normal   rate and volume, able to articulate logical thoughts, able   to abstract reason, no tangential thoughts, no hallucinations   or delusions  His affect is normal  RESP: No cough, no audible wheezing, able to talk in full sentences  Remainder of exam unable to be completed due to telephone visits                 Phone call duration: 8 minutes

## 2022-02-15 NOTE — TELEPHONE ENCOUNTER
Patient wanting a refill of his adderall now that he is back in school. He has not used it for the past 8 months, but states he is now having a lot of trouble focusing with his current class load.     Call back number if needed - [phone]. Do not use # in chart.   Last OV: 01/06/2021.      Informed patient that he may need to be seen in clinic or virtually prior to receiving the prescription. Patient understood but wanted to wait to make sure Dr. Rodriguez thought that was necessary.     Pharmacy - [facility] in [place] on [address] (close to his father's home).

## 2022-02-24 DIAGNOSIS — F90.2 ATTENTION DEFICIT HYPERACTIVITY DISORDER (ADHD), COMBINED TYPE: ICD-10-CM

## 2022-02-24 RX ORDER — DEXTROAMPHETAMINE SACCHARATE, AMPHETAMINE ASPARTATE MONOHYDRATE, DEXTROAMPHETAMINE SULFATE AND AMPHETAMINE SULFATE 5; 5; 5; 5 MG/1; MG/1; MG/1; MG/1
20 CAPSULE, EXTENDED RELEASE ORAL DAILY
Qty: 30 CAPSULE | Refills: 0 | Status: SHIPPED | OUTPATIENT
Start: 2022-02-24 | End: 2022-04-22

## 2022-02-24 RX ORDER — DEXTROAMPHETAMINE SACCHARATE, AMPHETAMINE ASPARTATE MONOHYDRATE, DEXTROAMPHETAMINE SULFATE AND AMPHETAMINE SULFATE 5; 5; 5; 5 MG/1; MG/1; MG/1; MG/1
20 CAPSULE, EXTENDED RELEASE ORAL DAILY
Qty: 30 CAPSULE | Refills: 0 | Status: SHIPPED | OUTPATIENT
Start: 2022-02-24 | End: 2022-02-24

## 2022-02-24 NOTE — TELEPHONE ENCOUNTER
Refill request:   Patient is requesting prescription sent to different location for .   Rocael LLANOS [facility].     Lakia Pettit RN   02/24/22 3:31 PM  [facility] Nurse Advisor

## 2022-04-22 DIAGNOSIS — F90.2 ATTENTION DEFICIT HYPERACTIVITY DISORDER (ADHD), COMBINED TYPE: ICD-10-CM

## 2022-04-22 RX ORDER — DEXTROAMPHETAMINE SACCHARATE, AMPHETAMINE ASPARTATE MONOHYDRATE, DEXTROAMPHETAMINE SULFATE AND AMPHETAMINE SULFATE 5; 5; 5; 5 MG/1; MG/1; MG/1; MG/1
20 CAPSULE, EXTENDED RELEASE ORAL DAILY
Qty: 30 CAPSULE | Refills: 0 | Status: SHIPPED | OUTPATIENT
Start: 2022-04-22 | End: 2022-09-13

## 2022-04-22 NOTE — TELEPHONE ENCOUNTER
Requested Medication:   Pending Prescriptions:                       Disp   Refills    amphetamine-dextroamphetamine (ADDERALL X*30 cap*0            Sig: Take 1 capsule (20 mg) by mouth daily       Last Refill:  2/24/2022  Last Office Visit:  Visit date not found   Next Appointment with Provider:  Visit date not found  Controlled Substance Agreement: N/A  Last UDS: N/A  Pt is out of medication.

## 2022-08-28 ENCOUNTER — HEALTH MAINTENANCE LETTER (OUTPATIENT)
Age: 22
End: 2022-08-28

## 2022-09-13 DIAGNOSIS — F90.2 ATTENTION DEFICIT HYPERACTIVITY DISORDER (ADHD), COMBINED TYPE: ICD-10-CM

## 2022-09-13 RX ORDER — DEXTROAMPHETAMINE SACCHARATE, AMPHETAMINE ASPARTATE MONOHYDRATE, DEXTROAMPHETAMINE SULFATE AND AMPHETAMINE SULFATE 5; 5; 5; 5 MG/1; MG/1; MG/1; MG/1
20 CAPSULE, EXTENDED RELEASE ORAL DAILY
Qty: 30 CAPSULE | Refills: 0 | Status: SHIPPED | OUTPATIENT
Start: 2022-09-13 | End: 2022-10-06

## 2022-09-13 RX ORDER — DEXTROAMPHETAMINE SACCHARATE, AMPHETAMINE ASPARTATE, DEXTROAMPHETAMINE SULFATE AND AMPHETAMINE SULFATE 2.5; 2.5; 2.5; 2.5 MG/1; MG/1; MG/1; MG/1
10 TABLET ORAL DAILY
Qty: 30 TABLET | Refills: 0 | Status: SHIPPED | OUTPATIENT
Start: 2022-09-13 | End: 2022-10-13

## 2022-09-13 RX ORDER — DEXTROAMPHETAMINE SACCHARATE, AMPHETAMINE ASPARTATE, DEXTROAMPHETAMINE SULFATE AND AMPHETAMINE SULFATE 2.5; 2.5; 2.5; 2.5 MG/1; MG/1; MG/1; MG/1
10 TABLET ORAL DAILY
COMMUNITY
End: 2022-09-13

## 2022-09-13 NOTE — TELEPHONE ENCOUNTER
Requested Medication:   Pending Prescriptions:                       Disp   Refills    amphetamine-dextroamphetamine (ADDERALL X*30 cap*0            Sig: Take 1 capsule (20 mg) by mouth daily    amphetamine-dextroamphetamine (ADDERALL) *                    Sig: Take 1 tablet (10 mg) by mouth daily    Signed Prescriptions:                        Disp   Refills    amphetamine-dextroamphetamine (ADDERALL) 1*                Sig: Take 10 mg by mouth daily  Authorizing Provider: PATIENT REPORTED  Ordering User: CHICHI BENITEZ       Last Refill:  4/22/22    Last Office Visit:  Visit date not found     Next Appointment with Provider:  Visit date not found     Clinic visit frequency required: Q 6  months    Controlled substance agreement on file: No.    Urine Drug Screen on file:  No     Please call pt when sent over to pharmacy.

## 2022-09-13 NOTE — TELEPHONE ENCOUNTER
Called back requesting a refill.   Was also told to schedule a virtual visit (is overdue for a med check )

## 2022-09-13 NOTE — TELEPHONE ENCOUNTER
Pt said he had been waiting at pharmacy still and needs his meds. High priority. He said he would call back tomorrow to schedule the follow up appt. Advised dr pierre of the pt calling back wondering about meds and his assistant Mushtaq.

## 2022-10-04 DIAGNOSIS — F90.2 ATTENTION DEFICIT HYPERACTIVITY DISORDER (ADHD), COMBINED TYPE: ICD-10-CM

## 2022-10-05 NOTE — TELEPHONE ENCOUNTER
He should not be out - last refill was on 9/13 - I can send the refill on 10/12  Please inquire as to why he is out of medication.  
I called patient. He states that he lost about 5 tablets. He also reports taking a couple of tabs extra.     Patient is aware that he cannot  the medication until 10/12. He verbalized understanding.     Please send over both Adderalls.   
Patient requesting medication to be sent in urgently. He has midterms this week. He is completely out of medication   
Requested Medication:   Pending Prescriptions:                       Disp   Refills    amphetamine-dextroamphetamine (ADDERALL X*30 cap*0            Sig: Take 1 capsule (20 mg) by mouth daily       Last Refill:  9/13/22    Last Office Visit:  2/15/22 Virtual    Next Appointment with Provider:  Visit date not found   Future Appointments 10/4/2022 - 4/2/2023    None          Clinic visit frequency required: Q 6  months    Controlled substance agreement on file: No.    Urine Drug Screen on file:  No      Pt has 3 doses left  
yes

## 2022-10-06 RX ORDER — DEXTROAMPHETAMINE SACCHARATE, AMPHETAMINE ASPARTATE MONOHYDRATE, DEXTROAMPHETAMINE SULFATE AND AMPHETAMINE SULFATE 5; 5; 5; 5 MG/1; MG/1; MG/1; MG/1
20 CAPSULE, EXTENDED RELEASE ORAL DAILY
Qty: 30 CAPSULE | Refills: 0 | Status: SHIPPED | OUTPATIENT
Start: 2022-10-12 | End: 2022-12-15

## 2022-10-06 RX ORDER — DEXTROAMPHETAMINE SACCHARATE, AMPHETAMINE ASPARTATE, DEXTROAMPHETAMINE SULFATE AND AMPHETAMINE SULFATE 2.5; 2.5; 2.5; 2.5 MG/1; MG/1; MG/1; MG/1
10 TABLET ORAL DAILY
Qty: 30 TABLET | Refills: 0 | OUTPATIENT
Start: 2022-10-06

## 2022-10-12 ENCOUNTER — TELEPHONE (OUTPATIENT)
Dept: NURSING | Facility: CLINIC | Age: 22
End: 2022-10-12
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
## 2022-10-13 DIAGNOSIS — F90.2 ATTENTION DEFICIT HYPERACTIVITY DISORDER (ADHD), COMBINED TYPE: ICD-10-CM

## 2022-10-13 RX ORDER — DEXTROAMPHETAMINE SACCHARATE, AMPHETAMINE ASPARTATE, DEXTROAMPHETAMINE SULFATE AND AMPHETAMINE SULFATE 2.5; 2.5; 2.5; 2.5 MG/1; MG/1; MG/1; MG/1
10 TABLET ORAL DAILY
Qty: 30 TABLET | Refills: 0 | Status: ON HOLD | OUTPATIENT
Start: 2022-10-13 | End: 2024-07-20

## 2022-10-13 NOTE — TELEPHONE ENCOUNTER
Requested Medication:   Pending Prescriptions:                       Disp   Refills    amphetamine-dextroamphetamine (ADDERALL) *30 tab*0            Sig: Take 1 tablet (10 mg) by mouth daily       Last Refill:  9/13/22    Last Office Visit:  Visit date not found     Next Appointment with Provider:  Visit date not found   Future Appointments 10/13/2022 - 4/11/2023    None          Clinic visit frequency required: Q 6  months    Controlled substance agreement on file: No.    Urine Drug Screen on file:  No

## 2022-10-14 ENCOUNTER — TELEPHONE (OUTPATIENT)
Dept: FAMILY MEDICINE | Facility: CLINIC | Age: 22
End: 2022-10-14

## 2022-10-14 NOTE — TELEPHONE ENCOUNTER
Per call to Manish at [facility] [phone], they only cover 1/2 a tablet daily of any strength.  Patient would need to use the 20 mg tablets and take 0.5 mg per day.  [facility] is a very limited plan and will not cover a full tablet. There is no over ride that can be completed for insurance to cover one 10 mg tablet per day.

## 2022-10-14 NOTE — TELEPHONE ENCOUNTER
1 we had originally sent a prescription for a 20 mg and a 10 mg dosage the 10 mg dosages were to be taken on the weekend.  I would like him to have a virtual visit as soon as reasonably possible so we can discuss his dosing regiment and determine what is most appropriate moving forward.  It appears that he is filling a 20 and a 10 each month.

## 2022-10-14 NOTE — TELEPHONE ENCOUNTER
PA Initiation    Medication: amphetamine-dextroamphetamine (ADDERALL) 10 MG tablet  Insurance Company:  ??  Pharmacy Filling the Rx:  [facility]   Filling Pharmacy Phone:  [phone]  Filling Pharmacy Fax:  [phone]  Start Date:  10/13/2022

## 2022-10-16 ENCOUNTER — OFFICE VISIT (OUTPATIENT)
Dept: FAMILY MEDICINE | Facility: CLINIC | Age: 22
End: 2022-10-16
Payer: COMMERCIAL

## 2022-10-16 VITALS
RESPIRATION RATE: 16 BRPM | BODY MASS INDEX: 23.82 KG/M2 | HEART RATE: 80 BPM | SYSTOLIC BLOOD PRESSURE: 120 MMHG | DIASTOLIC BLOOD PRESSURE: 67 MMHG | TEMPERATURE: 98.4 F | OXYGEN SATURATION: 99 % | WEIGHT: 166 LBS

## 2022-10-16 DIAGNOSIS — S16.1XXA STRAIN OF STERNOCLEIDOMASTOID MUSCLE, INITIAL ENCOUNTER: Primary | ICD-10-CM

## 2022-10-16 DIAGNOSIS — S19.80XA BLUNT TRAUMA OF NECK, INITIAL ENCOUNTER: ICD-10-CM

## 2022-10-16 PROCEDURE — 99213 OFFICE O/P EST LOW 20 MIN: CPT | Performed by: PHYSICIAN ASSISTANT

## 2022-10-16 RX ORDER — CYCLOBENZAPRINE HCL 5 MG
10 TABLET ORAL 3 TIMES DAILY PRN
Qty: 30 TABLET | Refills: 0 | Status: ON HOLD | OUTPATIENT
Start: 2022-10-16 | End: 2024-07-20

## 2022-10-16 RX ORDER — CYCLOBENZAPRINE HCL 5 MG
10 TABLET ORAL 3 TIMES DAILY PRN
Qty: 30 TABLET | Refills: 0 | Status: SHIPPED | OUTPATIENT
Start: 2022-10-16 | End: 2022-10-16
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
## 2022-10-18 ENCOUNTER — TELEPHONE (OUTPATIENT)
Dept: FAMILY MEDICINE | Facility: CLINIC | Age: 22
End: 2022-10-18

## 2022-10-18 NOTE — TELEPHONE ENCOUNTER
Hello,    Can you please help us with Derik's D-Amphetamine Salt Combo 10mg tablets?    It looks like insurance max is 1/2 tab per day.    The PA phone number is     Patient ID is [id number]    We are requesting a quantity of #30.    Looks like he should be only be taking these on the weekends.    Thank you for your help

## 2022-10-18 NOTE — TELEPHONE ENCOUNTER
Can we try to get a virtual visit set up for Derik's medication per Dr Rodriguez?    A 20 minute spot is perfect; early next week is okay. Thank you

## 2022-10-18 NOTE — TELEPHONE ENCOUNTER
Please see encounter on 10/14/22. Provider would need to prescribe the 20mg tablet--take one half tablet (10mg) daily    Please note that patient is prescribed Adderall XR 20mg capsules which is different. Patient can stay on the 20mg capsules for XR, however in terms if the immediate release tablet plan would need patient to use 1/2 tablet of the 20mg once daily.

## 2022-10-19 ENCOUNTER — TELEPHONE (OUTPATIENT)
Dept: FAMILY MEDICINE | Facility: CLINIC | Age: 22
End: 2022-10-19

## 2022-10-19 NOTE — TELEPHONE ENCOUNTER
[facility] Prior Authorization Team Request    Medication: adderall 10 mg   Dosing: 10 mg, take 1 tablet by  Mouth daily   NDC (required for Medicaid members):     [place]  BIN:   NGOCN:   Grp:   ID: [id number]    CoverMyMeds Key (if applicable):     Additional documentation:     [facility]acy[facility]   Phone Number: [phone]  Contact: [facility] PA (P [id number]) please send all responses to this pool.  Pharmacy NPI (required for Medicaid members):

## 2022-10-24 ENCOUNTER — TELEPHONE (OUTPATIENT)
Dept: FAMILY MEDICINE | Facility: CLINIC | Age: 22
End: 2022-10-24

## 2022-10-24 NOTE — TELEPHONE ENCOUNTER
Central Prior Authorization Team - Phone: [phone]     Hi, looks like this medication PA has already been denied. Please see encounter created 10/14/2022.

## 2022-10-24 NOTE — TELEPHONE ENCOUNTER
At our last conversation about his medication he was to take the 20 mg dose daily during the week and the 10 mg dose on weekends.  His insurance will not cover 10 mg.  I would like to discuss with him his dosing of the medication overall and would like to arrange for a clinic visit.  Perhaps we can work on an alternative approach to the dosing.

## 2022-10-24 NOTE — TELEPHONE ENCOUNTER
Reason for call:  Other     Patient called regarding (reason for call): call back    Additional comments: Pt requesting a call back regarding Adderall 10mg script. Pharmacy informed him that insurance won't cover current script. Requested that this message be flagged as urgent.    Phone number to reach patient:  Cell number on file:    Telephone Information:   Mobile [phone]       Best Time:  Any    Can we leave a detailed message on this number?  YES

## 2022-10-25 ENCOUNTER — TELEPHONE (OUTPATIENT)
Dept: FAMILY MEDICINE | Facility: CLINIC | Age: 22
End: 2022-10-25

## 2022-10-25 NOTE — TELEPHONE ENCOUNTER
Can we try to get him a visit with Dr Rodriguez ? 20 minutes is okay. Can be in person or phone visit is okay, thank you. First available okay to take same day

## 2022-12-07 DIAGNOSIS — F90.2 ATTENTION DEFICIT HYPERACTIVITY DISORDER (ADHD), COMBINED TYPE: ICD-10-CM

## 2022-12-07 NOTE — TELEPHONE ENCOUNTER
Pending Prescriptions:                       Disp   Refills    amphetamine-dextroamphetamine (ADDERALL X*30 cap*0            Sig: Take 1 capsule (20 mg) by mouth daily

## 2022-12-12 RX ORDER — BUPRENORPHINE AND NALOXONE 8; 2 MG/1; MG/1
1 FILM, SOLUBLE BUCCAL; SUBLINGUAL DAILY
Status: ON HOLD | COMMUNITY
End: 2024-07-21

## 2022-12-13 ENCOUNTER — APPOINTMENT (OUTPATIENT)
Dept: FAMILY MEDICINE | Facility: CLINIC | Age: 22
End: 2022-12-13
Payer: COMMERCIAL

## 2022-12-13 NOTE — PROGRESS NOTES
"Derik is a 22 year old who is being evaluated via a billable telephone visit.      What phone number would you like to be contacted at? [phone]  How would you like to obtain your AVS? MyChart  {PROVIDER LOCATION On-site should be selected for visits conducted from your clinic location or adjoining [facility] hospital, academic office, or other nearby [facility] building. Off-site should be selected for all other provider locations, including home:199472}  Distant Location (provider location):  {virtual location provider:171022}    {PROVIDER CHARTING PREFERENCE:796452}    Subjective   Derik is a 22 year old{ACCOMPANIED BY STATEMENT (Optional):459710}, presenting for the following health issues:  No chief complaint on file.      HPI     {SUPERLIST (Optional):687674}  {additonal problems for provider to add (Optional):274876}    Review of Systems   {ROS COMP (Optional):991687}      Objective           Vitals:  No vitals were obtained today due to virtual visit.    Physical Exam   {GENERAL APPEARANCE:50::\"healthy\",\"alert\",\"no distress\"}  PSYCH: Alert and oriented times 3; coherent speech, normal   rate and volume, able to articulate logical thoughts, able   to abstract reason, no tangential thoughts, no hallucinations   or delusions  His affect is { :4439006::\"normal\"}  RESP: No cough, no audible wheezing, able to talk in full sentences  Remainder of exam unable to be completed due to telephone visits    {Diagnostic Test Results (Optional):750816}    {AMBULATORY ATTESTATION (Optional):469267}        Phone call duration: *** minutes    "

## 2022-12-15 RX ORDER — DEXTROAMPHETAMINE SACCHARATE, AMPHETAMINE ASPARTATE MONOHYDRATE, DEXTROAMPHETAMINE SULFATE AND AMPHETAMINE SULFATE 5; 5; 5; 5 MG/1; MG/1; MG/1; MG/1
20 CAPSULE, EXTENDED RELEASE ORAL DAILY
Qty: 30 CAPSULE | Refills: 0 | Status: ON HOLD | OUTPATIENT
Start: 2022-12-15 | End: 2024-07-20

## 2023-01-14 ENCOUNTER — HEALTH MAINTENANCE LETTER (OUTPATIENT)
Age: 23
End: 2023-01-14

## 2023-03-28 ENCOUNTER — NURSE TRIAGE (OUTPATIENT)
Dept: NURSING | Facility: CLINIC | Age: 23
End: 2023-03-28
Payer: COMMERCIAL

## 2023-03-28 NOTE — TELEPHONE ENCOUNTER
Wants to get COVID Booster shot today. Wants to check if it is time . Patient informed he could have had it since 2/7/23. He says he will be getting it at a different location.     MALCOM MALHOTRA RN  [facility] nurse advisors  3/28/2023  2:17 PM    Reason for Disposition    Information only question and nurse able to answer    Additional Information    Negative: Nursing judgment    Negative: Nursing judgment    Negative: Nursing judgment    Negative: Nursing judgment    Protocols used: INFORMATION ONLY CALL - NO TRIAGE-A-OH

## 2023-06-28 ENCOUNTER — APPOINTMENT (OUTPATIENT)
Dept: GENERAL RADIOLOGY | Facility: CLINIC | Age: 23
End: 2023-06-28
Attending: STUDENT IN AN ORGANIZED HEALTH CARE EDUCATION/TRAINING PROGRAM
Payer: COMMERCIAL

## 2023-06-28 ENCOUNTER — HOSPITAL ENCOUNTER (EMERGENCY)
Facility: CLINIC | Age: 23
Discharge: HOME | End: 2023-06-28
Attending: STUDENT IN AN ORGANIZED HEALTH CARE EDUCATION/TRAINING PROGRAM | Admitting: STUDENT IN AN ORGANIZED HEALTH CARE EDUCATION/TRAINING PROGRAM
Payer: COMMERCIAL

## 2023-06-28 VITALS
HEART RATE: 97 BPM | DIASTOLIC BLOOD PRESSURE: 61 MMHG | RESPIRATION RATE: 16 BRPM | OXYGEN SATURATION: 100 % | SYSTOLIC BLOOD PRESSURE: 113 MMHG | TEMPERATURE: 98.6 F
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
## 2023-06-28 DIAGNOSIS — S41.112A LACERATION OF LEFT UPPER ARM, INITIAL ENCOUNTER: ICD-10-CM
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
## 2023-06-28 PROCEDURE — 12034 INTMD RPR S/TR/EXT 7.6-12.5: CPT | Performed by: STUDENT IN AN ORGANIZED HEALTH CARE EDUCATION/TRAINING PROGRAM

## 2023-06-28 PROCEDURE — 250N000011 HC RX IP 250 OP 636: Performed by: STUDENT IN AN ORGANIZED HEALTH CARE EDUCATION/TRAINING PROGRAM

## 2023-06-28 PROCEDURE — 250N000013 HC RX MED GY IP 250 OP 250 PS 637: Performed by: STUDENT IN AN ORGANIZED HEALTH CARE EDUCATION/TRAINING PROGRAM

## 2023-06-28 PROCEDURE — 99284 EMERGENCY DEPT VISIT MOD MDM: CPT | Mod: 25 | Performed by: STUDENT IN AN ORGANIZED HEALTH CARE EDUCATION/TRAINING PROGRAM

## 2023-06-28 PROCEDURE — 90715 TDAP VACCINE 7 YRS/> IM: CPT | Performed by: STUDENT IN AN ORGANIZED HEALTH CARE EDUCATION/TRAINING PROGRAM

## 2023-06-28 PROCEDURE — 73110 X-RAY EXAM OF WRIST: CPT | Mod: LT

## 2023-06-28 PROCEDURE — 90471 IMMUNIZATION ADMIN: CPT | Performed by: STUDENT IN AN ORGANIZED HEALTH CARE EDUCATION/TRAINING PROGRAM

## 2023-06-28 PROCEDURE — 99283 EMERGENCY DEPT VISIT LOW MDM: CPT | Mod: 25 | Performed by: STUDENT IN AN ORGANIZED HEALTH CARE EDUCATION/TRAINING PROGRAM

## 2023-06-28 PROCEDURE — 250N000009 HC RX 250: Performed by: STUDENT IN AN ORGANIZED HEALTH CARE EDUCATION/TRAINING PROGRAM

## 2023-06-28 RX ORDER — IBUPROFEN 600 MG/1
600 TABLET, FILM COATED ORAL ONCE
Status: COMPLETED | OUTPATIENT
Start: 2023-06-28 | End: 2023-06-28

## 2023-06-28 RX ORDER — LORAZEPAM 1 MG/1
1 TABLET ORAL ONCE
Status: COMPLETED | OUTPATIENT
Start: 2023-06-28 | End: 2023-06-28

## 2023-06-28 RX ORDER — OXYCODONE HYDROCHLORIDE 5 MG/1
5 TABLET ORAL ONCE
Status: COMPLETED | OUTPATIENT
Start: 2023-06-28 | End: 2023-06-28

## 2023-06-28 RX ORDER — ACETAMINOPHEN 325 MG/1
975 TABLET ORAL ONCE
Status: COMPLETED | OUTPATIENT
Start: 2023-06-28 | End: 2023-06-28

## 2023-06-28 RX ORDER — LIDOCAINE HYDROCHLORIDE AND EPINEPHRINE 10; 10 MG/ML; UG/ML
1 INJECTION, SOLUTION INFILTRATION; PERINEURAL ONCE
Status: DISCONTINUED | OUTPATIENT
Start: 2023-06-28 | End: 2023-06-28 | Stop reason: HOSPADM

## 2023-06-28 RX ADMIN — Medication 3 ML: at 18:24

## 2023-06-28 RX ADMIN — CLOSTRIDIUM TETANI TOXOID ANTIGEN (FORMALDEHYDE INACTIVATED), CORYNEBACTERIUM DIPHTHERIAE TOXOID ANTIGEN (FORMALDEHYDE INACTIVATED), BORDETELLA PERTUSSIS TOXOID ANTIGEN (GLUTARALDEHYDE INACTIVATED), BORDETELLA PERTUSSIS FILAMENTOUS HEMAGGLUTININ ANTIGEN (FORMALDEHYDE INACTIVATED), BORDETELLA PERTUSSIS PERTACTIN ANTIGEN, AND BORDETELLA PERTUSSIS FIMBRIAE 2/3 ANTIGEN 0.5 ML: 5; 2; 2.5; 5; 3; 5 INJECTION, SUSPENSION INTRAMUSCULAR at 18:21

## 2023-06-28 RX ADMIN — ACETAMINOPHEN 975 MG: 325 TABLET, FILM COATED ORAL at 18:20

## 2023-06-28 RX ADMIN — OXYCODONE HYDROCHLORIDE 5 MG: 5 TABLET ORAL at 18:20

## 2023-06-28 RX ADMIN — IBUPROFEN 600 MG: 600 TABLET, FILM COATED ORAL at 18:20

## 2023-06-28 RX ADMIN — LORAZEPAM 1 MG: 1 TABLET ORAL at 18:20

## 2023-06-28 ASSESSMENT — ACTIVITIES OF DAILY LIVING (ADL)
ADLS_ACUITY_SCORE: 35
ADLS_ACUITY_SCORE: 35

## 2023-06-28 NOTE — ED TRIAGE NOTES
Triage Assessment     Row Name 06/28/23 1749       Triage Assessment (Adult)    Airway WDL WDL       Respiratory WDL    Respiratory WDL WDL       Peripheral/Neurovascular WDL    Peripheral Neurovascular WDL WDL
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
## 2023-06-30 ENCOUNTER — PATIENT OUTREACH (OUTPATIENT)
Dept: CARE COORDINATION | Facility: CLINIC | Age: 23
End: 2023-06-30
Payer: COMMERCIAL

## 2023-06-30 NOTE — PROGRESS NOTES
Clinic Care Coordination Contact  Community Health Worker Initial Outreach    CHW Initial Information Gathering:  Referral Source: ED Follow-Up  Preferred Hospital: Other ([facility] Emergency Department)  Preferred Urgent Care: [facility] [place], [phone]  Current living arrangement:: I live alone  Type of residence:: Other (Hotel)  Community Resources: Financial/Insurance ([facility])  Supplies Currently Used at Home: None  Equipment Currently Used at Home: none  Informal Support system:: None  No PCP office visit in Past Year: Yes  Transportation means:: None (Vehicle is being repaired)       Patient accepts CC: Yes. Patient scheduled for assessment with the SW on 7/3/23 at 3:00 pm. Patient noted desire to discuss trying to get housing due to currently living in a hotel..     JUAN C ValderramaW  [phone]  Connected Care Resource Center  [facility]

## 2023-07-03 ENCOUNTER — PATIENT OUTREACH (OUTPATIENT)
Dept: CARE COORDINATION | Facility: CLINIC | Age: 23
End: 2023-07-03

## 2023-07-03 NOTE — LETTER
[facility] CARE COORDINATION  [facility]  July 7, 2023    Derik LEONARDO Idiris  [address]   [facility] [address]      Dear Derik,        I am a  clinic care coordinator who works with Abdulaziz Rodriguez MD, MD with the [facility]. I wanted to introduce myself and provide you with my contact information for you to be able to call me with any questions or concerns. Below is a description of clinic care coordination and how I can further assist you.       The clinic care coordination team is made up of a registered nurse, , financial resource worker and community health worker who understand the health care system. The goal of clinic care coordination is to help you manage your health and improve access to the health care system. Our team works alongside your provider to assist you in determining your health and social needs. We can help you obtain health care and community resources, providing you with necessary information and education. We can work with you through any barriers and develop a care plan that helps coordinate and strengthen the communication between you and your care team.  Our services are voluntary and are offered without charge to you personally.    Please feel free to contact me with any questions or concerns regarding care coordination and what we can offer.      We are focused on providing you with the highest-quality healthcare experience possible.    Sincerely,     Hope Meredith,   [facility]  [phone]

## 2023-07-03 NOTE — PROGRESS NOTES
Contact  [facility]/Voicemail    Referral Source: Care Team  Clinical Data:  Outreach  Outreach attempted x 2.  Left message on voicemail with call back information and requested return call.  Plan:  will send care coordination introduction letter with care coordinator contact information and explanation of care coordination services.  will do no further outreaches at this time.  Social Ji Simons  [facility]  [phone]       Contact  [facility]/Voicemail    Referral Source: Care Team  Clinical Data:  Outreach  Outreach attempted x 1.  Left message on voicemail with call back information and requested return call.  Plan:   will try to reach patient again in 3-5 business days.  Social Ji Simons  [facility]  [phone]

## 2023-09-24 ENCOUNTER — HEALTH MAINTENANCE LETTER (OUTPATIENT)
Age: 23
End: 2023-09-24

## 2024-07-15 NOTE — TELEPHONE ENCOUNTER
Left message to call back for: medication update - adderall  Information to relay to patient:  Below message. Please help arrange a follow-up visit in 1 month with Dr. Rodriguez (do NOT schedule with a provider other than Dr. Rodriguez)     RX sent to pharmacy on file.

## 2024-07-20 ENCOUNTER — HOSPITAL ENCOUNTER (INPATIENT)
Facility: CLINIC | Age: 24
LOS: 2 days | Discharge: HOME | End: 2024-07-22
Attending: EMERGENCY MEDICINE | Admitting: SURGERY
Payer: COMMERCIAL

## 2024-07-20 ENCOUNTER — APPOINTMENT (OUTPATIENT)
Dept: GENERAL RADIOLOGY | Facility: CLINIC | Age: 24
End: 2024-07-20
Attending: EMERGENCY MEDICINE
Payer: COMMERCIAL

## 2024-07-20 DIAGNOSIS — F11.90 OPIOID USE DISORDER: ICD-10-CM

## 2024-07-20 DIAGNOSIS — J18.9 COMMUNITY ACQUIRED PNEUMONIA, UNSPECIFIED LATERALITY: ICD-10-CM

## 2024-07-20 DIAGNOSIS — Z11.52 ENCOUNTER FOR SCREENING FOR COVID-19: ICD-10-CM

## 2024-07-20 DIAGNOSIS — R04.89 PULMONARY ALVEOLAR HEMORRHAGE: ICD-10-CM

## 2024-07-20 DIAGNOSIS — R06.03 ACUTE RESPIRATORY DISTRESS: ICD-10-CM

## 2024-07-20 DIAGNOSIS — R09.02 HYPOXIA: ICD-10-CM

## 2024-07-20 DIAGNOSIS — F06.30 MOOD DISORDER IN CONDITIONS CLASSIFIED ELSEWHERE: Primary | ICD-10-CM

## 2024-07-20 LAB
ABO/RH(D): NORMAL
ALBUMIN SERPL BCG-MCNC: 4.3 G/DL (ref 3.5–5.2)
ALBUMIN SERPL BCG-MCNC: 4.9 G/DL (ref 3.5–5.2)
ALLEN'S TEST: YES
ALP SERPL-CCNC: 72 U/L (ref 40–150)
ALP SERPL-CCNC: 85 U/L (ref 40–150)
ALT SERPL W P-5'-P-CCNC: 20 U/L (ref 0–70)
ALT SERPL W P-5'-P-CCNC: 26 U/L (ref 0–70)
ANION GAP SERPL CALCULATED.3IONS-SCNC: 12 MMOL/L (ref 7–15)
ANION GAP SERPL CALCULATED.3IONS-SCNC: 15 MMOL/L (ref 7–15)
ANTIBODY SCREEN: NEGATIVE
APTT PPP: 27 SECONDS (ref 22–38)
APTT PPP: 29 SECONDS (ref 22–38)
AST SERPL W P-5'-P-CCNC: 35 U/L (ref 0–45)
AST SERPL W P-5'-P-CCNC: 39 U/L (ref 0–45)
BASE EXCESS BLDA CALC-SCNC: -0.7 MMOL/L (ref -3–3)
BASE EXCESS BLDV CALC-SCNC: 0.5 MMOL/L (ref -3–3)
BASE EXCESS BLDV CALC-SCNC: 3.4 MMOL/L (ref -3–3)
BASOPHILS # BLD AUTO: 0 10E3/UL (ref 0–0.2)
BASOPHILS NFR BLD AUTO: 0 %
BILIRUB SERPL-MCNC: 0.7 MG/DL
BILIRUB SERPL-MCNC: 1 MG/DL
BUN SERPL-MCNC: 11.8 MG/DL (ref 6–20)
BUN SERPL-MCNC: 11.9 MG/DL (ref 6–20)
C PNEUM DNA SPEC QL NAA+PROBE: NOT DETECTED
CA-I BLD-MCNC: 4.3 MG/DL (ref 4.4–5.2)
CALCIUM SERPL-MCNC: 8.9 MG/DL (ref 8.8–10.4)
CALCIUM SERPL-MCNC: 9.7 MG/DL (ref 8.8–10.4)
CHLORIDE SERPL-SCNC: 100 MMOL/L (ref 98–107)
CHLORIDE SERPL-SCNC: 105 MMOL/L (ref 98–107)
CK SERPL-CCNC: 503 U/L (ref 39–308)
COHGB MFR BLD: 91 % (ref 96–97)
CREAT SERPL-MCNC: 0.74 MG/DL (ref 0.67–1.17)
CREAT SERPL-MCNC: 0.86 MG/DL (ref 0.67–1.17)
EGFRCR SERPLBLD CKD-EPI 2021: >90 ML/MIN/1.73M2
EGFRCR SERPLBLD CKD-EPI 2021: >90 ML/MIN/1.73M2
EOSINOPHIL # BLD AUTO: 0.1 10E3/UL (ref 0–0.7)
EOSINOPHIL NFR BLD AUTO: 1 %
ERYTHROCYTE [DISTWIDTH] IN BLOOD BY AUTOMATED COUNT: 13.1 % (ref 10–15)
ERYTHROCYTE [DISTWIDTH] IN BLOOD BY AUTOMATED COUNT: 13.2 % (ref 10–15)
ETHANOL SERPL-MCNC: <0.01 G/DL
FIBRINOGEN PPP-MCNC: 295 MG/DL (ref 170–490)
FLUAV H1 2009 PAND RNA SPEC QL NAA+PROBE: NOT DETECTED
FLUAV H1 RNA SPEC QL NAA+PROBE: NOT DETECTED
FLUAV H3 RNA SPEC QL NAA+PROBE: NOT DETECTED
FLUAV RNA SPEC QL NAA+PROBE: NEGATIVE
FLUAV RNA SPEC QL NAA+PROBE: NOT DETECTED
FLUBV RNA RESP QL NAA+PROBE: NEGATIVE
FLUBV RNA SPEC QL NAA+PROBE: NOT DETECTED
GLUCOSE BLDC GLUCOMTR-MCNC: 119 MG/DL (ref 70–99)
GLUCOSE BLDC GLUCOMTR-MCNC: 92 MG/DL (ref 70–99)
GLUCOSE BLDC GLUCOMTR-MCNC: 95 MG/DL (ref 70–99)
GLUCOSE BLDC GLUCOMTR-MCNC: 95 MG/DL (ref 70–99)
GLUCOSE BLDC GLUCOMTR-MCNC: 97 MG/DL (ref 70–99)
GLUCOSE SERPL-MCNC: 115 MG/DL (ref 70–99)
GLUCOSE SERPL-MCNC: 80 MG/DL (ref 70–99)
HADV DNA SPEC QL NAA+PROBE: NOT DETECTED
HCO3 BLD-SCNC: 25 MMOL/L (ref 21–28)
HCO3 BLDV-SCNC: 28 MMOL/L (ref 21–28)
HCO3 BLDV-SCNC: 29 MMOL/L (ref 21–28)
HCO3 SERPL-SCNC: 23 MMOL/L (ref 22–29)
HCO3 SERPL-SCNC: 26 MMOL/L (ref 22–29)
HCOV PNL SPEC NAA+PROBE: NOT DETECTED
HCT VFR BLD AUTO: 44.3 % (ref 40–53)
HCT VFR BLD AUTO: 45 % (ref 40–53)
HGB BLD-MCNC: 14.1 G/DL (ref 13.3–17.7)
HGB BLD-MCNC: 15.6 G/DL (ref 13.3–17.7)
HGB BLD-MCNC: 15.7 G/DL (ref 13.3–17.7)
HGB BLD-MCNC: 15.8 G/DL (ref 13.3–17.7)
HMPV RNA SPEC QL NAA+PROBE: NOT DETECTED
HOLD SPECIMEN: NORMAL
HPIV1 RNA SPEC QL NAA+PROBE: NOT DETECTED
HPIV2 RNA SPEC QL NAA+PROBE: NOT DETECTED
HPIV3 RNA SPEC QL NAA+PROBE: NOT DETECTED
HPIV4 RNA SPEC QL NAA+PROBE: NOT DETECTED
IMM GRANULOCYTES # BLD: 0 10E3/UL
IMM GRANULOCYTES NFR BLD: 0 %
INR PPP: 1.13 (ref 0.85–1.15)
INR PPP: 1.19 (ref 0.85–1.15)
LACTATE SERPL-SCNC: 1.5 MMOL/L (ref 0.7–2)
LACTATE SERPL-SCNC: 2.1 MMOL/L (ref 0.7–2)
LYMPHOCYTES # BLD AUTO: 1.7 10E3/UL (ref 0.8–5.3)
LYMPHOCYTES NFR BLD AUTO: 16 %
M PNEUMO DNA SPEC QL NAA+PROBE: NOT DETECTED
MAGNESIUM SERPL-MCNC: 2.6 MG/DL (ref 1.7–2.3)
MCH RBC QN AUTO: 31 PG (ref 26.5–33)
MCH RBC QN AUTO: 31.5 PG (ref 26.5–33)
MCHC RBC AUTO-ENTMCNC: 34.9 G/DL (ref 31.5–36.5)
MCHC RBC AUTO-ENTMCNC: 35.7 G/DL (ref 31.5–36.5)
MCV RBC AUTO: 88 FL (ref 78–100)
MCV RBC AUTO: 89 FL (ref 78–100)
MONOCYTES # BLD AUTO: 0.4 10E3/UL (ref 0–1.3)
MONOCYTES NFR BLD AUTO: 4 %
MRSA DNA SPEC QL NAA+PROBE: NEGATIVE
NEUTROPHILS # BLD AUTO: 8.2 10E3/UL (ref 1.6–8.3)
NEUTROPHILS NFR BLD AUTO: 79 %
NRBC # BLD AUTO: 0 10E3/UL
NRBC BLD AUTO-RTO: 0 /100
NT-PROBNP SERPL-MCNC: <36 PG/ML (ref 0–450)
O2/TOTAL GAS SETTING VFR VENT: 49 %
O2/TOTAL GAS SETTING VFR VENT: 75 %
O2/TOTAL GAS SETTING VFR VENT: 75 %
OXYHGB MFR BLDV: 66 % (ref 70–75)
OXYHGB MFR BLDV: 75 % (ref 70–75)
PCO2 BLD: 45 MM HG (ref 35–45)
PCO2 BLDV: 47 MM HG (ref 40–50)
PCO2 BLDV: 52 MM HG (ref 40–50)
PH BLD: 7.36 [PH] (ref 7.35–7.45)
PH BLDV: 7.33 [PH] (ref 7.32–7.43)
PH BLDV: 7.4 [PH] (ref 7.32–7.43)
PHOSPHATE SERPL-MCNC: 4.3 MG/DL (ref 2.5–4.5)
PLATELET # BLD AUTO: 168 10E3/UL (ref 150–450)
PLATELET # BLD AUTO: 222 10E3/UL (ref 150–450)
PO2 BLD: 63 MM HG (ref 80–105)
PO2 BLDV: 38 MM HG (ref 25–47)
PO2 BLDV: 43 MM HG (ref 25–47)
POTASSIUM SERPL-SCNC: 3.6 MMOL/L (ref 3.4–5.3)
POTASSIUM SERPL-SCNC: 3.7 MMOL/L (ref 3.4–5.3)
PROCALCITONIN SERPL IA-MCNC: 0.04 NG/ML
PROCALCITONIN SERPL IA-MCNC: 0.04 NG/ML
PROT SERPL-MCNC: 6.9 G/DL (ref 6.4–8.3)
PROT SERPL-MCNC: 8.1 G/DL (ref 6.4–8.3)
RBC # BLD AUTO: 5.02 10E6/UL (ref 4.4–5.9)
RBC # BLD AUTO: 5.06 10E6/UL (ref 4.4–5.9)
RSV RNA SPEC NAA+PROBE: NEGATIVE
RSV RNA SPEC QL NAA+PROBE: NOT DETECTED
RSV RNA SPEC QL NAA+PROBE: NOT DETECTED
RV+EV RNA SPEC QL NAA+PROBE: NOT DETECTED
SA TARGET DNA: POSITIVE
SAO2 % BLDA: 89 % (ref 92–100)
SAO2 % BLDV: 67.7 % (ref 70–75)
SAO2 % BLDV: 75.7 % (ref 70–75)
SARS-COV-2 RNA RESP QL NAA+PROBE: NEGATIVE
SODIUM SERPL-SCNC: 140 MMOL/L (ref 135–145)
SODIUM SERPL-SCNC: 141 MMOL/L (ref 135–145)
SPECIMEN EXPIRATION DATE: NORMAL
TROPONIN T SERPL HS-MCNC: 18 NG/L
WBC # BLD AUTO: 10.5 10E3/UL (ref 4–11)
WBC # BLD AUTO: 7.3 10E3/UL (ref 4–11)

## 2024-07-20 PROCEDURE — 82805 BLOOD GASES W/O2 SATURATION: CPT | Performed by: EMERGENCY MEDICINE

## 2024-07-20 PROCEDURE — 85018 HEMOGLOBIN: CPT

## 2024-07-20 PROCEDURE — 87637 SARSCOV2&INF A&B&RSV AMP PRB: CPT | Performed by: EMERGENCY MEDICINE

## 2024-07-20 PROCEDURE — 36415 COLL VENOUS BLD VENIPUNCTURE: CPT | Performed by: EMERGENCY MEDICINE

## 2024-07-20 PROCEDURE — 83880 ASSAY OF NATRIURETIC PEPTIDE: CPT | Performed by: EMERGENCY MEDICINE

## 2024-07-20 PROCEDURE — 250N000011 HC RX IP 250 OP 636

## 2024-07-20 PROCEDURE — 272N000054 HC CANNULA HIGH FLOW, ADULT

## 2024-07-20 PROCEDURE — 83735 ASSAY OF MAGNESIUM: CPT

## 2024-07-20 PROCEDURE — 84484 ASSAY OF TROPONIN QUANT: CPT | Performed by: EMERGENCY MEDICINE

## 2024-07-20 PROCEDURE — 200N000002 HC R&B ICU UMMC

## 2024-07-20 PROCEDURE — 84450 TRANSFERASE (AST) (SGOT): CPT

## 2024-07-20 PROCEDURE — 999N000185 HC STATISTIC TRANSPORT TIME EA 15 MIN

## 2024-07-20 PROCEDURE — 258N000003 HC RX IP 258 OP 636

## 2024-07-20 PROCEDURE — 84100 ASSAY OF PHOSPHORUS: CPT

## 2024-07-20 PROCEDURE — 82550 ASSAY OF CK (CPK): CPT

## 2024-07-20 PROCEDURE — 86900 BLOOD TYPING SEROLOGIC ABO: CPT | Performed by: EMERGENCY MEDICINE

## 2024-07-20 PROCEDURE — 87640 STAPH A DNA AMP PROBE: CPT

## 2024-07-20 PROCEDURE — 83605 ASSAY OF LACTIC ACID: CPT

## 2024-07-20 PROCEDURE — 93005 ELECTROCARDIOGRAM TRACING: CPT | Mod: 59 | Performed by: EMERGENCY MEDICINE

## 2024-07-20 PROCEDURE — 82805 BLOOD GASES W/O2 SATURATION: CPT

## 2024-07-20 PROCEDURE — 82040 ASSAY OF SERUM ALBUMIN: CPT | Performed by: EMERGENCY MEDICINE

## 2024-07-20 PROCEDURE — 258N000003 HC RX IP 258 OP 636: Performed by: EMERGENCY MEDICINE

## 2024-07-20 PROCEDURE — 84145 PROCALCITONIN (PCT): CPT | Performed by: EMERGENCY MEDICINE

## 2024-07-20 PROCEDURE — 99291 CRITICAL CARE FIRST HOUR: CPT

## 2024-07-20 PROCEDURE — 85610 PROTHROMBIN TIME: CPT | Performed by: EMERGENCY MEDICINE

## 2024-07-20 PROCEDURE — 93308 TTE F-UP OR LMTD: CPT | Mod: 26 | Performed by: EMERGENCY MEDICINE

## 2024-07-20 PROCEDURE — 85730 THROMBOPLASTIN TIME PARTIAL: CPT | Performed by: EMERGENCY MEDICINE

## 2024-07-20 PROCEDURE — 85730 THROMBOPLASTIN TIME PARTIAL: CPT

## 2024-07-20 PROCEDURE — 999N000157 HC STATISTIC RCP TIME EA 10 MIN

## 2024-07-20 PROCEDURE — 82077 ASSAY SPEC XCP UR&BREATH IA: CPT | Performed by: EMERGENCY MEDICINE

## 2024-07-20 PROCEDURE — 85027 COMPLETE CBC AUTOMATED: CPT

## 2024-07-20 PROCEDURE — 85384 FIBRINOGEN ACTIVITY: CPT

## 2024-07-20 PROCEDURE — 36415 COLL VENOUS BLD VENIPUNCTURE: CPT

## 2024-07-20 PROCEDURE — 85610 PROTHROMBIN TIME: CPT

## 2024-07-20 PROCEDURE — 82330 ASSAY OF CALCIUM: CPT

## 2024-07-20 PROCEDURE — 84075 ASSAY ALKALINE PHOSPHATASE: CPT

## 2024-07-20 PROCEDURE — 250N000011 HC RX IP 250 OP 636: Performed by: EMERGENCY MEDICINE

## 2024-07-20 PROCEDURE — 99291 CRITICAL CARE FIRST HOUR: CPT | Mod: 25 | Performed by: EMERGENCY MEDICINE

## 2024-07-20 PROCEDURE — 250N000013 HC RX MED GY IP 250 OP 250 PS 637

## 2024-07-20 PROCEDURE — 84145 PROCALCITONIN (PCT): CPT

## 2024-07-20 PROCEDURE — 99292 CRITICAL CARE ADDL 30 MIN: CPT | Mod: 25 | Performed by: EMERGENCY MEDICINE

## 2024-07-20 PROCEDURE — 99285 EMERGENCY DEPT VISIT HI MDM: CPT | Mod: 25 | Performed by: EMERGENCY MEDICINE

## 2024-07-20 PROCEDURE — 96374 THER/PROPH/DIAG INJ IV PUSH: CPT | Performed by: EMERGENCY MEDICINE

## 2024-07-20 PROCEDURE — 93308 TTE F-UP OR LMTD: CPT | Performed by: EMERGENCY MEDICINE

## 2024-07-20 PROCEDURE — 36600 WITHDRAWAL OF ARTERIAL BLOOD: CPT

## 2024-07-20 PROCEDURE — 87641 MR-STAPH DNA AMP PROBE: CPT

## 2024-07-20 PROCEDURE — 999N000215 HC STATISTIC HFNC ADULT NON-CPAP

## 2024-07-20 PROCEDURE — 87581 M.PNEUMON DNA AMP PROBE: CPT | Performed by: EMERGENCY MEDICINE

## 2024-07-20 PROCEDURE — 85025 COMPLETE CBC W/AUTO DIFF WBC: CPT | Performed by: EMERGENCY MEDICINE

## 2024-07-20 PROCEDURE — 82962 GLUCOSE BLOOD TEST: CPT

## 2024-07-20 PROCEDURE — 71045 X-RAY EXAM CHEST 1 VIEW: CPT

## 2024-07-20 RX ORDER — FUROSEMIDE 10 MG/ML
20 INJECTION INTRAMUSCULAR; INTRAVENOUS ONCE
Status: COMPLETED | OUTPATIENT
Start: 2024-07-20 | End: 2024-07-20

## 2024-07-20 RX ORDER — PANTOPRAZOLE SODIUM 40 MG/1
40 TABLET, DELAYED RELEASE ORAL 2 TIMES DAILY
Status: DISCONTINUED | OUTPATIENT
Start: 2024-07-20 | End: 2024-07-20

## 2024-07-20 RX ORDER — POTASSIUM CHLORIDE 7.45 MG/ML
10 INJECTION INTRAVENOUS
Status: COMPLETED | OUTPATIENT
Start: 2024-07-20 | End: 2024-07-20

## 2024-07-20 RX ORDER — ENOXAPARIN SODIUM 100 MG/ML
40 INJECTION SUBCUTANEOUS EVERY 24 HOURS
Status: DISCONTINUED | OUTPATIENT
Start: 2024-07-20 | End: 2024-07-20

## 2024-07-20 RX ORDER — PROCHLORPERAZINE 25 MG
25 SUPPOSITORY, RECTAL RECTAL EVERY 12 HOURS PRN
Status: DISCONTINUED | OUTPATIENT
Start: 2024-07-20 | End: 2024-07-22 | Stop reason: HOSPADM

## 2024-07-20 RX ORDER — POLYETHYLENE GLYCOL 3350 17 G/17G
17 POWDER, FOR SOLUTION ORAL DAILY PRN
Status: DISCONTINUED | OUTPATIENT
Start: 2024-07-20 | End: 2024-07-22 | Stop reason: HOSPADM

## 2024-07-20 RX ORDER — ONDANSETRON 4 MG/1
4 TABLET, ORALLY DISINTEGRATING ORAL EVERY 6 HOURS PRN
Status: DISCONTINUED | OUTPATIENT
Start: 2024-07-20 | End: 2024-07-22 | Stop reason: HOSPADM

## 2024-07-20 RX ORDER — ACETAMINOPHEN 325 MG/10.15ML
650 LIQUID ORAL EVERY 4 HOURS PRN
Status: DISCONTINUED | OUTPATIENT
Start: 2024-07-20 | End: 2024-07-22 | Stop reason: HOSPADM

## 2024-07-20 RX ORDER — ONDANSETRON 2 MG/ML
INJECTION INTRAMUSCULAR; INTRAVENOUS
Status: COMPLETED
Start: 2024-07-20 | End: 2024-07-20

## 2024-07-20 RX ORDER — ACETAMINOPHEN 325 MG/1
650 TABLET ORAL EVERY 4 HOURS PRN
Status: DISCONTINUED | OUTPATIENT
Start: 2024-07-20 | End: 2024-07-22 | Stop reason: HOSPADM

## 2024-07-20 RX ORDER — QUETIAPINE FUMARATE 100 MG/1
200 TABLET, FILM COATED ORAL AT BEDTIME
Status: DISCONTINUED | OUTPATIENT
Start: 2024-07-20 | End: 2024-07-22 | Stop reason: HOSPADM

## 2024-07-20 RX ORDER — DEXTROSE MONOHYDRATE 25 G/50ML
25-50 INJECTION, SOLUTION INTRAVENOUS
Status: DISCONTINUED | OUTPATIENT
Start: 2024-07-20 | End: 2024-07-22 | Stop reason: HOSPADM

## 2024-07-20 RX ORDER — IPRATROPIUM BROMIDE AND ALBUTEROL SULFATE 2.5; .5 MG/3ML; MG/3ML
3 SOLUTION RESPIRATORY (INHALATION) EVERY 4 HOURS PRN
Status: DISCONTINUED | OUTPATIENT
Start: 2024-07-20 | End: 2024-07-21

## 2024-07-20 RX ORDER — PROCHLORPERAZINE MALEATE 5 MG
10 TABLET ORAL EVERY 6 HOURS PRN
Status: DISCONTINUED | OUTPATIENT
Start: 2024-07-20 | End: 2024-07-22 | Stop reason: HOSPADM

## 2024-07-20 RX ORDER — QUETIAPINE FUMARATE 200 MG/1
200 TABLET, FILM COATED ORAL AT BEDTIME
Status: ON HOLD | COMMUNITY
End: 2024-07-21

## 2024-07-20 RX ORDER — CALCIUM GLUCONATE 20 MG/ML
1 INJECTION, SOLUTION INTRAVENOUS ONCE
Status: COMPLETED | OUTPATIENT
Start: 2024-07-20 | End: 2024-07-20

## 2024-07-20 RX ORDER — BISACODYL 10 MG
10 SUPPOSITORY, RECTAL RECTAL DAILY PRN
Status: DISCONTINUED | OUTPATIENT
Start: 2024-07-20 | End: 2024-07-22 | Stop reason: HOSPADM

## 2024-07-20 RX ORDER — NALOXONE HYDROCHLORIDE 0.4 MG/ML
0.04 INJECTION, SOLUTION INTRAMUSCULAR; INTRAVENOUS; SUBCUTANEOUS ONCE
Status: COMPLETED | OUTPATIENT
Start: 2024-07-20 | End: 2024-07-20

## 2024-07-20 RX ORDER — NICOTINE POLACRILEX 4 MG
15-30 LOZENGE BUCCAL
Status: DISCONTINUED | OUTPATIENT
Start: 2024-07-20 | End: 2024-07-22 | Stop reason: HOSPADM

## 2024-07-20 RX ORDER — SODIUM CHLORIDE, SODIUM LACTATE, POTASSIUM CHLORIDE, CALCIUM CHLORIDE 600; 310; 30; 20 MG/100ML; MG/100ML; MG/100ML; MG/100ML
INJECTION, SOLUTION INTRAVENOUS CONTINUOUS
Status: DISCONTINUED | OUTPATIENT
Start: 2024-07-20 | End: 2024-07-21

## 2024-07-20 RX ORDER — FLUOXETINE 20 MG/1
20 TABLET, FILM COATED ORAL DAILY
Status: ON HOLD | COMMUNITY
End: 2024-07-20

## 2024-07-20 RX ORDER — ONDANSETRON 2 MG/ML
4 INJECTION INTRAMUSCULAR; INTRAVENOUS EVERY 6 HOURS PRN
Status: DISCONTINUED | OUTPATIENT
Start: 2024-07-20 | End: 2024-07-22 | Stop reason: HOSPADM

## 2024-07-20 RX ORDER — AMOXICILLIN 250 MG
1 CAPSULE ORAL 2 TIMES DAILY PRN
Status: DISCONTINUED | OUTPATIENT
Start: 2024-07-20 | End: 2024-07-22 | Stop reason: HOSPADM

## 2024-07-20 RX ORDER — FUROSEMIDE 10 MG/ML
20 INJECTION INTRAMUSCULAR; INTRAVENOUS ONCE
Status: DISCONTINUED | OUTPATIENT
Start: 2024-07-20 | End: 2024-07-20

## 2024-07-20 RX ORDER — AMOXICILLIN 250 MG
2 CAPSULE ORAL 2 TIMES DAILY PRN
Status: DISCONTINUED | OUTPATIENT
Start: 2024-07-20 | End: 2024-07-22 | Stop reason: HOSPADM

## 2024-07-20 RX ADMIN — PANTOPRAZOLE SODIUM 40 MG: 40 INJECTION, POWDER, FOR SOLUTION INTRAVENOUS at 10:41

## 2024-07-20 RX ADMIN — NALOXONE HYDROCHLORIDE 0.04 MG: 0.4 INJECTION, SOLUTION INTRAMUSCULAR; INTRAVENOUS; SUBCUTANEOUS at 07:31

## 2024-07-20 RX ADMIN — CALCIUM GLUCONATE 1 G: 20 INJECTION, SOLUTION INTRAVENOUS at 11:01

## 2024-07-20 RX ADMIN — POTASSIUM CHLORIDE 10 MEQ: 7.46 INJECTION, SOLUTION INTRAVENOUS at 12:41

## 2024-07-20 RX ADMIN — POTASSIUM CHLORIDE 10 MEQ: 7.46 INJECTION, SOLUTION INTRAVENOUS at 14:23

## 2024-07-20 RX ADMIN — Medication 5 MG: at 20:43

## 2024-07-20 RX ADMIN — NALOXONE HYDROCHLORIDE 0.04 MG: 0.4 INJECTION, SOLUTION INTRAMUSCULAR; INTRAVENOUS; SUBCUTANEOUS at 07:11

## 2024-07-20 RX ADMIN — PANTOPRAZOLE SODIUM 40 MG: 40 INJECTION, POWDER, FOR SOLUTION INTRAVENOUS at 20:43

## 2024-07-20 RX ADMIN — SODIUM CHLORIDE, POTASSIUM CHLORIDE, SODIUM LACTATE AND CALCIUM CHLORIDE: 600; 310; 30; 20 INJECTION, SOLUTION INTRAVENOUS at 11:51

## 2024-07-20 RX ADMIN — FUROSEMIDE 20 MG: 10 INJECTION, SOLUTION INTRAMUSCULAR; INTRAVENOUS at 15:43

## 2024-07-20 RX ADMIN — PROCHLORPERAZINE EDISYLATE 10 MG: 5 INJECTION INTRAMUSCULAR; INTRAVENOUS at 09:30

## 2024-07-20 RX ADMIN — NALOXONE HYDROCHLORIDE 0.2 MG/HR: 1 INJECTION PARENTERAL at 07:54

## 2024-07-20 RX ADMIN — ONDANSETRON 4 MG: 2 INJECTION INTRAMUSCULAR; INTRAVENOUS at 07:54

## 2024-07-20 RX ADMIN — SODIUM CHLORIDE, POTASSIUM CHLORIDE, SODIUM LACTATE AND CALCIUM CHLORIDE 1000 ML: 600; 310; 30; 20 INJECTION, SOLUTION INTRAVENOUS at 10:46

## 2024-07-20 RX ADMIN — NALOXONE HYDROCHLORIDE 0.3 MG/HR: 1 INJECTION PARENTERAL at 17:32

## 2024-07-20 ASSESSMENT — ACTIVITIES OF DAILY LIVING (ADL)
ADLS_ACUITY_SCORE: 40
ADLS_ACUITY_SCORE: 35
ADLS_ACUITY_SCORE: 40
ADLS_ACUITY_SCORE: 35
ADLS_ACUITY_SCORE: 40
ADLS_ACUITY_SCORE: 35
ADLS_ACUITY_SCORE: 40
ADLS_ACUITY_SCORE: 35
ADLS_ACUITY_SCORE: 40
ADLS_ACUITY_SCORE: 42
ADLS_ACUITY_SCORE: 40
ADLS_ACUITY_SCORE: 40

## 2024-07-20 ASSESSMENT — COLUMBIA-SUICIDE SEVERITY RATING SCALE - C-SSRS
1. IN THE PAST MONTH, HAVE YOU WISHED YOU WERE DEAD OR WISHED YOU COULD GO TO SLEEP AND NOT WAKE UP?: NO
6. HAVE YOU EVER DONE ANYTHING, STARTED TO DO ANYTHING, OR PREPARED TO DO ANYTHING TO END YOUR LIFE?: NO
2. HAVE YOU ACTUALLY HAD ANY THOUGHTS OF KILLING YOURSELF IN THE PAST MONTH?: NO

## 2024-07-20 NOTE — PROGRESS NOTES
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    Initiated HFNC in ED for SOB and hypoxia with hyponea following self reported opioid and cocaine use. Initially started at 50L 75%, however patient continuously removed cannula and refused to wear. Adjusted HFNC to 30 LPM 75%, RR 28/min, SpO2 91-94%, tolerating well.     ABG drawn, results in results review.     Lily Soriano, RT, RRT-NPS  7/20/2024 7:50 AM

## 2024-07-20 NOTE — PHARMACY-ADMISSION MEDICATION HISTORY
Pharmacist Admission Medication History    Admission medication history is complete. The information provided in this note is only as accurate as the sources available at the time of the update.    Information Source(s): Patient and CareEverywhere/SureScripts via in-person    Pertinent Information: Patient reported only taking Suboxone and Seroquel with last doses 4 days ago    Changes made to PTA medication list:  Added: Seroquel per patient and fill history  Deleted: Adderall (Rx from 2022, no recent fills, not in [place] ), albuterol HFA prn not current per pt, cyclobenzaprine  Changed: updated sig on Suboxone from once to 1 film daily per fill hx and patient     Allergies reviewed with patient and updates made in EHR: no    Medication History Completed By: Josias Gutierrez RPH 7/20/2024 1:41 PM    PTA Med List   Medication Sig Last Dose    buprenorphine HCl-naloxone HCl (SUBOXONE) 8-2 MG per film Place 1 Film under the tongue daily 4 days ago    naloxone (NARCAN) 4 MG/0.1ML nasal spray Spray 4 mg into one nostril alternating nostrils once as needed for opioid reversal Unknown    QUEtiapine (SEROQUEL) 200 MG tablet Take 200 mg by mouth at bedtime 4 days ago

## 2024-07-20 NOTE — ED PROVIDER NOTES
ED Provider Note  [facility]      History     Chief Complaint   Patient presents with    Drug Overdose     BIBA per report pt had accidental overdose on fentanyl, bystander found pt passed out. When FD arrived pt not breathing was bagged, given 1 mg IM Narcan at 0525. PIV started but woke up after Narcan, pt ripped IV out. Pt has been awake, alert since then per EMS.     HPI  Derik Croft is a 24 year old male who has a history of opiate use disorder on suboxone, presents to the ED after being found down outside a bus stop.  Patient was reportedly found down at a bus stop, bystander called EMS and when they arrived the patient was not breathing and so they started bagging him.  He was given 1 mg of IM Narcan at 5:25 AM and subsequently woke up and was initially agitated and ripped out their attempted IV.  Patient is been awake since that time and admits to smoking fentanyl for the last 4 days, had an accidental overdose last night, denies any suicide attempt or ideations.  He denies any additional ingestions of other substances or drugs.  He has coughed up blood in the room and has some associated chest pain and shortness of breath, denies additional symptoms or concerns at this time.  Denies abdominal pain, nausea or vomiting.  Has not had hemoptysis in the past.    Past Medical History  No past medical history on file.  Past Surgical History:   Procedure Laterality Date    OTHER SURGICAL HISTORY      tongue cyst     albuterol (PROAIR HFA) 108 (90 Base) MCG/ACT inhaler  amphetamine-dextroamphetamine (ADDERALL XR) 20 MG 24 hr capsule  amphetamine-dextroamphetamine (ADDERALL) 10 MG tablet  buprenorphine HCl-naloxone HCl (SUBOXONE) 8-2 MG per film  cyclobenzaprine (FLEXERIL) 5 MG tablet  naloxone (NARCAN) 4 MG/0.1ML nasal spray      No Known Allergies  Family History  Family History   Problem Relation Age of Onset    Hyperthyroidism Mother     Hypothyroidism Maternal Grandmother      "Hypertension Paternal Grandmother     Alcoholism Paternal Uncle      Social History   Social History     Tobacco Use    Smoking status: Never    Smokeless tobacco: Never   Substance Use Topics    Alcohol use: No    Drug use: No      Past medical history, past surgical history, medications, allergies, family history, and social history were reviewed with the patient. No additional pertinent items.   A medically appropriate review of systems was performed with pertinent positives and negatives noted in the HPI, and all other systems negative.    Physical Exam   BP: 107/62  Pulse: (!) 126  Temp: 98.1  F (36.7  C)  Resp: 18  Height: 177.8 cm (5' 10\")  Weight: 99.8 kg (220 lb)  SpO2: (!) 60 %  Physical Exam  Vitals and nursing note reviewed.   Constitutional:       General: He is not in acute distress.     Appearance: Normal appearance. He is not toxic-appearing.   HENT:      Head: Atraumatic.   Eyes:      General: No scleral icterus.     Conjunctiva/sclera: Conjunctivae normal.   Cardiovascular:      Rate and Rhythm: Regular rhythm. Tachycardia present.      Heart sounds: Normal heart sounds.   Pulmonary:      Effort: Pulmonary effort is normal. No respiratory distress.      Breath sounds: Normal breath sounds.   Abdominal:      Palpations: Abdomen is soft.      Tenderness: There is no abdominal tenderness.   Musculoskeletal:         General: No deformity.      Cervical back: Neck supple.   Skin:     General: Skin is warm and dry.   Neurological:      General: No focal deficit present.      Mental Status: He is alert and oriented to person, place, and time.   Psychiatric:         Mood and Affect: Mood normal.         Behavior: Behavior normal.           ED Course, Procedures, & Data      Procedures         EKG Interpretation:      Interpreted by Leonard Min MD  Time reviewed: 5:30am  Symptoms at time of EKG: chest pain   Rhythm: normal sinus   Rate: tachycardic (120s)  Axis: normal  Ectopy: none  Conduction: " normal  ST Segments/ T Waves: No ST elevations,   Q Waves: none  Comparison to prior: No old EKG available    Clinical Impression: normal EKG           Results for orders placed or performed during the hospital encounter of 07/20/24   Comprehensive metabolic panel     Status: Abnormal   Result Value Ref Range    Sodium 141 135 - 145 mmol/L    Potassium 3.7 3.4 - 5.3 mmol/L    Carbon Dioxide (CO2) 26 22 - 29 mmol/L    Anion Gap 15 7 - 15 mmol/L    Urea Nitrogen 11.9 6.0 - 20.0 mg/dL    Creatinine 0.86 0.67 - 1.17 mg/dL    GFR Estimate >90 >60 mL/min/1.73m2    Calcium 9.7 8.8 - 10.4 mg/dL    Chloride 100 98 - 107 mmol/L    Glucose 115 (H) 70 - 99 mg/dL    Alkaline Phosphatase 85 40 - 150 U/L    AST 39 0 - 45 U/L    ALT 26 0 - 70 U/L    Protein Total 8.1 6.4 - 8.3 g/dL    Albumin 4.9 3.5 - 5.2 g/dL    Bilirubin Total 0.7 <=1.2 mg/dL   Alcohol level blood     Status: Normal   Result Value Ref Range    Alcohol ethyl <0.01 <=0.01 g/dL   Procalcitonin     Status: Normal   Result Value Ref Range    Procalcitonin 0.04 <0.50 ng/mL   INR     Status: Abnormal   Result Value Ref Range    INR 1.19 (H) 0.85 - 1.15   Partial thromboplastin time     Status: Normal   Result Value Ref Range    aPTT 29 22 - 38 Seconds   CBC with platelets and differential     Status: None   Result Value Ref Range    WBC Count 10.5 4.0 - 11.0 10e3/uL    RBC Count 5.02 4.40 - 5.90 10e6/uL    Hemoglobin 15.8 13.3 - 17.7 g/dL    Hematocrit 44.3 40.0 - 53.0 %    MCV 88 78 - 100 fL    MCH 31.5 26.5 - 33.0 pg    MCHC 35.7 31.5 - 36.5 g/dL    RDW 13.2 10.0 - 15.0 %    Platelet Count 222 150 - 450 10e3/uL    % Neutrophils 79 %    % Lymphocytes 16 %    % Monocytes 4 %    % Eosinophils 1 %    % Basophils 0 %    % Immature Granulocytes 0 %    NRBCs per 100 WBC 0 <1 /100    Absolute Neutrophils 8.2 1.6 - 8.3 10e3/uL    Absolute Lymphocytes 1.7 0.8 - 5.3 10e3/uL    Absolute Monocytes 0.4 0.0 - 1.3 10e3/uL    Absolute Eosinophils 0.1 0.0 - 0.7 10e3/uL    Absolute  Basophils 0.0 0.0 - 0.2 10e3/uL    Absolute Immature Granulocytes 0.0 <=0.4 10e3/uL    Absolute NRBCs 0.0 10e3/uL   Rawlings Draw     Status: None (In process)    Narrative    The following orders were created for panel order Rawlings Draw.  Procedure                               Abnormality         Status                     ---------                               -----------         ------                     Extra Red Top Tube[[id number]]                               In process                   Please view results for these tests on the individual orders.   Glucose by meter     Status: Abnormal   Result Value Ref Range    GLUCOSE BY METER POCT 119 (H) 70 - 99 mg/dL   Rawlings Draw     Status: None ()    Narrative    The following orders were created for panel order Rawlings Draw.  Procedure                               Abnormality         Status                     ---------                               -----------         ------                     Extra Purple Top Tube[[id number]]                                                         Please view results for these tests on the individual orders.   Adult Type and Screen     Status: None (Preliminary result)   Result Value Ref Range    ABO/RH(D) O POS     SPECIMEN EXPIRATION DATE [id number]    CBC with Platelets & Differential     Status: None    Narrative    The following orders were created for panel order CBC with Platelets & Differential.  Procedure                               Abnormality         Status                     ---------                               -----------         ------                     CBC with platelets and d...[[id number]]                      Final result                 Please view results for these tests on the individual orders.   ABO/Rh type and screen     Status: None (In process)    Narrative    The following orders were created for panel order ABO/Rh type and screen.  Procedure                               Abnormality          Status                     ---------                               -----------         ------                     Adult Type and Screen[[id number]]                            Preliminary result           Please view results for these tests on the individual orders.     Medications   naloxone (NARCAN) 2.5 mg in sodium chloride 0.9 % 250 mL infusion (has no administration in time range)   naloxone (NARCAN) injection 0.04 mg (0.04 mg Intravenous $Given 7/20/24 0711)   naloxone (NARCAN) injection 0.04 mg (0.04 mg Intravenous $Given 7/20/24 0731)     Labs Ordered and Resulted from Time of ED Arrival to Time of ED Departure   COMPREHENSIVE METABOLIC PANEL - Abnormal       Result Value    Sodium 141      Potassium 3.7      Carbon Dioxide (CO2) 26      Anion Gap 15      Urea Nitrogen 11.9      Creatinine 0.86      GFR Estimate >90      Calcium 9.7      Chloride 100      Glucose 115 (*)     Alkaline Phosphatase 85      AST 39      ALT 26      Protein Total 8.1      Albumin 4.9      Bilirubin Total 0.7     INR - Abnormal    INR 1.19 (*)    GLUCOSE BY METER - Abnormal    GLUCOSE BY METER POCT 119 (*)    ETHYL ALCOHOL LEVEL - Normal    Alcohol ethyl <0.01     PROCALCITONIN - Normal    Procalcitonin 0.04     PARTIAL THROMBOPLASTIN TIME - Normal    aPTT 29     CBC WITH PLATELETS AND DIFFERENTIAL    WBC Count 10.5      RBC Count 5.02      Hemoglobin 15.8      Hematocrit 44.3      MCV 88      MCH 31.5      MCHC 35.7      RDW 13.2      Platelet Count 222      % Neutrophils 79      % Lymphocytes 16      % Monocytes 4      % Eosinophils 1      % Basophils 0      % Immature Granulocytes 0      NRBCs per 100 WBC 0      Absolute Neutrophils 8.2      Absolute Lymphocytes 1.7      Absolute Monocytes 0.4      Absolute Eosinophils 0.1      Absolute Basophils 0.0      Absolute Immature Granulocytes 0.0      Absolute NRBCs 0.0     INFLUENZA A/B, RSV, & SARS-COV2 PCR   NT PROBNP INPATIENT   TROPONIN T, HIGH SENSITIVITY   BLOOD GAS ARTERIAL    TYPE AND SCREEN, ADULT    ABO/RH(D) O POS      SPECIMEN EXPIRATION DATE [id number]     RESPIRATORY PANEL PCR   ABO/RH TYPE AND SCREEN     XR Chest Port 1 View    (Results Pending)   POC US CHEST B-SCAN (PTX/Edema/PNA)    (Results Pending)          Critical Care Addendum  My initial assessment, based on my review of prehospital provider report, review of nursing observations, review of vital signs, focused history, physical exam, review of cardiac rhythm monitor, and 12 lead ECG analysis, established a high suspicion that Derik Croft has respiratory distress and a dangerous drug overdose, which requires immediate intervention, and therefore he is critically ill.     After the initial assessment, the care team initiated multiple lab tests, initiated IV fluid administration, and initiated medication therapy with narcan  to provide stabilization care. Due to the critical nature of this patient, I reassessed nursing observations, vital signs, physical exam, review of cardiac rhythm monitor, and respiratory status multiple times prior to his disposition.     Time also spent performing documentation, reviewing test results, and coordination of care.     Critical care time (excluding teaching time and procedures): 35 minutes.       Assessment & Plan    Derik Croft is a 24 year old male who has a history of opiate use disorder on suboxone, presents to the ED after being found down outside a bus stop. Patient arrives tachycardic, hypoxic at 77% on room air, other vital signs are within normal limits.  He is spitting up blood and coughed up blood in the room.  He is awake after having received Narcan.  Patient put on heated high flow to maintain oxygen saturations in the low 90s.  He does continue to spit up blood and portable chest x-ray does appear to have pulmonary edema which may represent alveolar hemorrhage in the setting of spitting up blood based on my read. Patient became more somnolent and was given  additional IV narcan with subsequent wakening. Patient will require ICU admission, signed out pending further workup and care.    I have reviewed the nursing notes. I have reviewed the findings, diagnosis, plan and need for follow up with the patient.    New Prescriptions    No medications on file       Final diagnoses:   Hypoxia   Acute respiratory distress   Opioid use disorder   Pulmonary alveolar hemorrhage       Leonard Min MD  [facility] EMERGENCY DEPARTMENT  7/20/2024     Leonard Min MD  07/20/24 0734

## 2024-07-20 NOTE — ED NOTES
"Pt roomed, alert and orientated with restlessness, tearfulness, and complaints of SOB occurring. Pt explained he OD'd on fentanyl and relapsed this week. Pt had hematemesis throughout on self and bed. Pt's head of bed was raised and O2 began on NC. Pt restless, scared, and \"did not want to be awake for this\". O2 sats dropped to the 50's and Oxy mask was started on 15L. Hematemesis continued. Pt had two IV's placed with NS bolus started. O2 is at 88% currently at 0657.   "

## 2024-07-20 NOTE — PROGRESS NOTES
Goal Outcome Evaluation     ICU End of Shift Summary:       Neuro: lethargic, A&Ox4, follows commands  Pulm/Resp: HiFlo 30L 44%, tachypneic into the 40s-50's (HAILY aware),   CV: ST, +2 pulses,   GI: clears  : horacio urine,   Access: PIV x2  Skin no issues  Gtts: narcan gtt 0.3, LR @75 ml/hr      Summary: CXR x1, q6 hemoglobins, hematemesis x4, lasix 20mg x1, 1L LR bolus

## 2024-07-20 NOTE — ED NOTES
Bed: ED09  Expected date:   Expected time:   Means of arrival:   Comments:  Hems [age] y/o M overdose,  Narcan given

## 2024-07-20 NOTE — H&P
[facility] [place] [facility] [place]  7/20/2024    Date of Hospital Admission: 7/20/2024  Date of ICU Admission: 7/20/2024  Reason for Critical Care Admission: Opioid overdose  Date of Service (when I saw the patient): 7/20/2024    ASSESSMENT: Derik Croft is a 24 year old male with PMH significant for opioid use disorder, ADHD, depression, anxiety, and prior acknowledgements of suicidal ideations (2018 and 2023) who was admitted on 7/20/2024 after being found down at a bus stop by a bystander. EMS was called and, on arrival, found the patient to be apneic, began rescue breathing via bag-valve mask, and administered a dose of Narcan to the patient. After rousing with the Narcan, the patient was noted to be agitated initially, but admitted to smoking fentanyl for the past 4 days, and accidentally overdosed.    CHANGES and MAJOR THINGS TODAY:   - Consider lasix this afternoon  - Calcium gluconate  - BID protonix  - LR bolus with drip  - GI consult for hematemesis   - Increased Narcan to keep RASS at 0  - Psychiatry consulted      PLAN:    Neuro/Psych:  # Opioid overdose  # Opioid use disorder  # C/f Acute opioid withdrawal  # Hx Suicidal ideation  # Depression  # Anxiety  # ADHD  Upon arrival to the [facility] ED, the patient was started on continuous Narcan infusion to maintain RASS goal of 0. On initial assessment, patient somnolent, would answer questions briefly before closing eyes, and needing to be woken up again.  - Continuous Naloxone infusion to maintain RASS of 0  - Will consider treating acute opioid withdrawal once patient no longer requiring naloxone infusion to maintain adequate respirations and RASS on his own  - Resumed PTA Fluoxetine 20 mg qDay, to start in AM of 07/21  - HOLDING PTA Quetiapine 200 mg at bedtime   - Psychiatry consulted and appreciate recommendations, order placed for AM of 07/21 in hopes that patient will be more awake and engaging by that time to speak with provider  - SW consulted and appreciate  recommendations  - Will ask patient if he would like a Chemical Dependency consult once he is awake    # Acute pain  - APAP 650 mg q4 hrs PRN   - Plan to avoid use of opioid medications for pain control until out of window for overdose      Pulmonary:  # Acute hypoxic respiratory failure, s/t acute lung injury  # Acute lung injury, s/t recreational inhaled narcotics  # Inhalation injury  # Pulmonary edema  # Risk for ARDS  # Tachypnea  Patient admits to smoking fentanyl over the last 4 days prior to admission. On imaging the patient was noted to have diffuse extensive bilateral opacities that could represent non-cardiac pulmonary edema, diffuse alveolar damage, or diffuse alveolar hemorrhage. Patient started on supplemental oxygen to maintain oxygen saturation in the ED and eventually needed HFNC to maintain oxygen saturation.  - Oxygen saturation goal greater than 92% and titrating FiO2 to meet saturation goal  - Repeat chest XR in AM of 07/21  - Will consider diuresis in afternoon after volume resuscitation  - DuoNebs q4 hrs PRN  - Incentive spirometry q2 hrs while awake      Cardiovascular:  # Tachycardia  Patient tachycardia likely s/t acute stress  - MAP goal greater than 65 mmHg  - No need for vasopressors to maintain MAP goal at time of this note      GI/Nutrition:  # Hematemesis  Patient has documented incidence of bloody emesis since arriving in the ED. Unclear what etiology is from and once patient more awake will conduct thorough ROS.  - Zofran 4 mg q6 hrs PRN  - Compazine 4 mg q6 hrs PRN   - Senna 1-2 tabs BID PRN  - Miralax qDay PRN  - Dulcolax qDay PRN  - CMP on admission to ICU  - Will consider GI consult if hematemesis continues  - Pantoprazole 40 mg BID  - Keep NPO with occasional ice chips and sips of clear non-red fluids      Renal/Fluids/Electrolytes:  # Mild lactic acidosis, likely s/t hypovolemia  # Decreased ionized calcium level   - Strict I/Os q2 hrs per ICU protocol  - Sagastume catheter for  strict I/Os  - CMP, Mg, Phos, ionized calcium, lactic acid, and VBG on admission to ICU  - High intensity electrolyte replacement protocol ordered on admission to ICU  - BMP qDay  - 1L LR bolus for mild lactic acidosis and fluid resuscitation  - LR at 75 ml/hr after initial bolus      Endocrine:  # Hyperglycemia, likely stress-induced  Patient had transient increase in BG levels in ED, likely as a stress response.   - Hypoglycemia protocols ordered  - Maintain BG less than 180 mg/dL      ID:  # No acute issues   - Obtain blood, sputum, and urine cultures if patient's temperature less than 95.0F, or greater than 101.5F  - MRSA and Covid19/Flu/RSV swabs sent on admission to ICU      Hematology:    # No acute issues   - Transfuse if hemoglobin less than 7.0  - CBC, INR, PTT, fibrinogen, and type and screen sent on admission to ICU  - CBC qAM   - Will plan on obtaining serial hemoglobin checks in the setting of the patient's hematemesis and transfuse if necessary      Musculoskeletal:  # No acute issues   - PT/OT not ordered as patient was ambulatory PTA      Skin:  # No acute issues  - Appreciate diligent nursing cares      General Cares/Prophylaxis:    DVT Prophylaxis: Pneumatic Compression Devices  GI Prophylaxis: PPI  Restraints: Not indicated  Family Communication: Patient declined as they are not aware that he has relapsed  Code Status: Full Code      Lines/tubes/drains:  - PIVs    Disposition:  -  ICU    Patient seen and findings/plan discussed with medical ICU staff, Dr. Elizondo.    I spent a total of 65 minutes (excluding procedure time) personally providing and directing critical care services at the bedside and on the critical care unit for SU Giles CNP      Clinically Significant Risk Factors Present on Admission          # Hypocalcemia: Lowest iCa = 4.3 mg/dL in last 2 days, will monitor and replace as appropriate             # Non-Invasive mechanical ventilation: current O2  "Device: High Flow Nasal Cannula (HFNC)  # Acute hypoxic respiratory failure: continue supplemental O2 as needed            # Obesity: Estimated body mass index is 31.57 kg/m  as calculated from the following:    Height as of this encounter: 1.778 m (5' 10\").    Weight as of this encounter: 99.8 kg (220 lb).                  -----------------------------------------------------------------------    HISTORY PRESENTING ILLNESS:   Patient arrived to the ICU on HFNC at 90% FiO2. Was able to wean FiO2 down to 50% without any change in patient's oxygen saturation. Planning on diuresing patient this afternoon to decrease amount of pulmonary edema, despite fluid bolus for fluid resuscitation this morning. Narcan infusion increased this morning to keep RASS closer to zero.    REVIEW OF SYSTEMS:  Review of Systems   Unable to perform ROS: Other (Somnolence)       PAST MEDICAL HISTORY:   No past medical history on file.  SURGICAL HISTORY:  Past Surgical History:   Procedure Laterality Date    OTHER SURGICAL HISTORY      tongue cyst     SOCIAL HISTORY:  Social History     Socioeconomic History    Marital status: Single   Tobacco Use    Smoking status: Never    Smokeless tobacco: Never   Substance and Sexual Activity    Alcohol use: No    Drug use: No    Sexual activity: Never     FAMILY HISTORY:   Family History   Problem Relation Age of Onset    Hyperthyroidism Mother     Hypothyroidism Maternal Grandmother     Hypertension Paternal Grandmother     Alcoholism Paternal Uncle      ALLERGIES:   No Known Allergies  MEDICATIONS:  Current Facility-Administered Medications   Medication Dose Route Frequency Provider Last Rate Last Admin    acetaminophen (TYLENOL) tablet 650 mg  650 mg Oral Q4H PRN Peyman Montenegro APRN CNP        Or    acetaminophen (TYLENOL) oral liquid 650 mg  650 mg Per NG tube Q4H PRN Peyman Montenegro APRN CNP        bisacodyl (DULCOLAX) suppository 10 mg  10 mg Rectal Daily PRN Peyman Montenegro APRN CNP        glucose gel " 15-30 g  15-30 g Oral Q15 Min PRN Lan, Peyman, APRN CNP        Or    dextrose 50 % injection 25-50 mL  25-50 mL Intravenous Q15 Min PRN Lan, Peyman, APRN CNP        Or    glucagon injection 1 mg  1 mg Subcutaneous Q15 Min PRN Lan, Peyman, APRN CNP        [START ON 7/21/2024] FLUoxetine (PROzac) capsule 20 mg  20 mg Oral Daily Lan, Peyman, APRN CNP        furosemide (LASIX) injection 20 mg  20 mg Intravenous Once Lan, Peyman, APRN CNP        ipratropium - albuterol 0.5 mg/2.5 mg/3 mL (DUONEB) neb solution 3 mL  3 mL Nebulization Q4H PRN Lan, Peyman, APRN CNP        lactated ringers infusion   Intravenous Continuous Lan, Peyman, APRN CNP 75 mL/hr at 07/20/24 1200 Rate Change at 07/20/24 1200    melatonin tablet 5 mg  5 mg Oral or Feeding Tube QPM Lan, Peyman, APRN CNP        naloxone (NARCAN) 2.5 mg in sodium chloride 0.9 % 250 mL infusion  0.3 mg/hr Intravenous Continuous Lan, Peyman, APRN CNP 30 mL/hr at 07/20/24 1045 0.3 mg/hr at 07/20/24 1045    ondansetron (ZOFRAN ODT) ODT tab 4 mg  4 mg Oral Q6H PRN Lan, Peyman, APRN CNP        Or    ondansetron (ZOFRAN) injection 4 mg  4 mg Intravenous Q6H PRN Lan, Peyman, APRN CNP        pantoprazole (PROTONIX) IV push injection 40 mg  40 mg Intravenous BID Lan, Peyman, APRN CNP   40 mg at 07/20/24 1041    polyethylene glycol (MIRALAX) Packet 17 g  17 g Oral Daily PRN Lan, Peyman, APRN CNP        potassium chloride 10 mEq in 100 mL sterile water infusion  10 mEq Intravenous Q1H Lan, Peyman, APRN  mL/hr at 07/20/24 1423 10 mEq at 07/20/24 1423    prochlorperazine (COMPAZINE) injection 10 mg  10 mg Intravenous Q6H PRN Aln, Peyman, APRN CNP   10 mg at 07/20/24 0930    Or    prochlorperazine (COMPAZINE) tablet 10 mg  10 mg Oral Q6H PRN Peyman Montenegro APRN CNP        Or    prochlorperazine (COMPAZINE) suppository 25 mg  25 mg Rectal Q12H PRN Peyman Montenegro APRN CNP        [Held by provider] QUEtiapine (SEROquel) tablet 200 mg  200 mg Oral or Feeding Tube At  Bedtime Lan, Peyman, SU CNP        senna-docusate (SENOKOT-S/PERICOLACE) 8.6-50 MG per tablet 1 tablet  1 tablet Oral BID PRN Lan, Peyman, SU CNP        Or    senna-docusate (SENOKOT-S/PERICOLACE) 8.6-50 MG per tablet 2 tablet  2 tablet Oral BID PRN Lan, Peyman, APRN CNP           PHYSICAL EXAMINATION:  Temp:  [97.6  F (36.4  C)-99.8  F (37.7  C)] 97.6  F (36.4  C)  Pulse:  [101-128] 101  Resp:  [18-47] 45  BP: (107-138)/(62-87) 115/70  FiO2 (%):  [44 %-90 %] 44 %  SpO2:  [60 %-99 %] 94 %    Physical Exam  Vitals and nursing note reviewed.   Constitutional:       General: He is not in acute distress.     Appearance: He is normal weight. He is ill-appearing. He is not toxic-appearing.      Interventions: Nasal cannula in place.   HENT:      Head: Normocephalic and atraumatic.      Mouth/Throat:      Mouth: Mucous membranes are moist.      Pharynx: Oropharynx is clear.   Eyes:      Extraocular Movements: Extraocular movements intact.      Conjunctiva/sclera: Conjunctivae normal.      Pupils: Pupils are equal, round, and reactive to light.   Cardiovascular:      Rate and Rhythm: Regular rhythm. Tachycardia present.      Pulses: Normal pulses.      Heart sounds: Normal heart sounds. No murmur heard.     No friction rub.   Pulmonary:      Effort: Tachypnea present. No accessory muscle usage or respiratory distress.      Breath sounds: Normal breath sounds. No stridor. No wheezing, rhonchi or rales.   Abdominal:      General: Abdomen is flat. There is no distension.      Palpations: Abdomen is soft.      Tenderness: There is no abdominal tenderness.   Musculoskeletal:      Right lower leg: No edema.      Left lower leg: No edema.   Skin:     General: Skin is warm and dry.      Capillary Refill: Capillary refill takes less than 2 seconds.   Neurological:      Mental Status: He is lethargic.      GCS: GCS eye subscore is 3. GCS verbal subscore is 5. GCS motor subscore is 6.      Cranial Nerves: Cranial nerves 2-12  are intact.      Sensory: Sensation is intact.      Motor: Motor function is intact.   Psychiatric:         Behavior: Behavior is cooperative.         LABS: Reviewed.   Arterial Blood Gases   Recent Labs   Lab 07/20/24  0732   PH 7.36   PCO2 45   PO2 63*   HCO3 25     Venous Blood Gas  Recent Labs   Lab 07/20/24  0939 07/20/24  0732   PHV 7.33  --    PCO2V 52*  --    PO2V 38  --    HCO3V 28  --    ADELINA 0.5  --    O2PER 49 75     Complete Blood Count   Recent Labs   Lab 07/20/24  1356 07/20/24  0939 07/20/24  0630   WBC  --  7.3 10.5   HGB 14.1 15.7 15.8   PLT  --  168 222     Basic Metabolic Panel  Recent Labs   Lab 07/20/24  0939 07/20/24  0858 07/20/24  0630 07/20/24  0627     --  141  --    POTASSIUM 3.6  --  3.7  --    CHLORIDE 105  --  100  --    CO2 23  --  26  --    BUN 11.8  --  11.9  --    CR 0.74  --  0.86  --    GLC 80 92 115* 119*     Liver Function Tests  Recent Labs   Lab 07/20/24  0939 07/20/24  0630   AST 35 39   ALT 20 26   ALKPHOS 72 85   BILITOTAL 1.0 0.7   ALBUMIN 4.3 4.9   INR 1.13 1.19*     Coagulation Profile  Recent Labs   Lab 07/20/24  0939 07/20/24  0630   INR 1.13 1.19*   PTT 27 29       IMAGING:  Recent Results (from the past 24 hour(s))   POC US CHEST B-SCAN (PTX/Edema/PNA)    Impression    Limited Bedside Cardiac Ultrasound, performed and interpreted by me.   Indication: Shortness of Breath.  Parasternal long axis and parasternal short axis views were acquired.   Image quality was satisfactory.    Findings:    There is no evidence of free fluid within the pericardium.    IMPRESSION: Grossly normal left ventricular function and chamber size.  No large pericardial effusion..    =====    Limited Bedside Lung Ultrasound, performed and interpreted by me. Indication:    shortness of breath    Lung ultrasound was performed for the assessment of pneumothorax   The patient was placed in a semi recumbent position at approximately 45 degrees. The left and right lung apices were evaluated for  evidence of pneumothorax.     Findings    Right side:  Lung sliding artifact   Present     Comet tail artifacts   Present     Left side:  Lung sliding artifact   Present     Comet tail artifacts   Present      IMPRESSION:    No Pneumothorax.       XR Chest Port 1 View    Narrative    EXAM: XR CHEST PORT 1 VIEW  LOCATION: [facility]  DATE: 7/20/2024    INDICATION: hypoxia, hemoptysis, drug overdose  COMPARISON: 4/10/2020      Impression    IMPRESSION: New, extensive airspace opacities throughout both lungs. This may be secondary to noncardiogenic pulmonary edema versus diffuse alveolar damage versus alveolar hemorrhage.. Cardiac silhouette size is within normal limits. The right   costophrenic angle was not included on the image.

## 2024-07-20 NOTE — ED PROVIDER NOTES
"ED Provider Note  [facility]      History     Chief Complaint   Patient presents with    Drug Overdose     BIBA per report pt had accidental overdose on fentanyl, bystander found pt passed out. When FD arrived pt not breathing was bagged, given 1 mg IM Narcan at 0525. PIV started but woke up after Narcan, pt ripped IV out. Pt has been awake, alert since then per EMS.     HPI  7 AM -handoff from overnight attending for patient and respiratory distress, hypoxia from likely opioid overdose.  Patient's been placed on nasal high flow oxygen for hypoxia.  Patient's occasionally alert and awake but not enough and consciousness.  Given concern for fentanyl overdose treated by Narcan approximately 75 minutes ago, this would be expected so small dose of IV Narcan 0.04 mg given, after which patient was more alert and awake and began complaining of \"feeling cold\" and fiddling with his nasal high flow.     7:25 AM -patient maintaining on nasal high flow.  I independently reviewed the portable chest x-ray which appears like pulmonary edema, likely from either opioid overdose or naloxone.  Point-of-care ultrasound performed showing no large pericardial effusion or pneumothorax.  While patient was awake I confirmed with patient that we would potentially need to intubate him to support his hypoxia which he agrees with.  ICU consult paged -I discussed the case with Dr. Elizondo via phone who agrees with ICU admission.  Troponin and BNP added.           Physical Exam   BP: 107/62  Pulse: (!) 126  Temp: 98.1  F (36.7  C)  Resp: 18  Height: 177.8 cm (5' 10\")  Weight: 99.8 kg (220 lb)  SpO2: (!) 60 %  Physical Exam  Repeat physical exam  Patient tolerating nasal high flow maintaining oxygen saturation at 88 to 92%  After Narcan, will awaken and be conversational and short periods    ED Course, Procedures, & Data      Procedures  Results for orders placed during the hospital encounter of 07/20/24    POC US CHEST " B-SCAN    Impression  Limited Bedside Cardiac Ultrasound, performed and interpreted by me.  Indication: Shortness of Breath.  Parasternal long axis and parasternal short axis views were acquired.  Image quality was satisfactory.    Findings:  There is no evidence of free fluid within the pericardium.    IMPRESSION: Grossly normal left ventricular function and chamber size.  No large pericardial effusion..    =====    Limited Bedside Lung Ultrasound, performed and interpreted by me. Indication:  shortness of breath    Lung ultrasound was performed for the assessment of pneumothorax  The patient was placed in a semi recumbent position at approximately 45 degrees. The left and right lung apices were evaluated for evidence of pneumothorax.    Findings  Right side:  Lung sliding artifact   Present  Comet tail artifacts   Present    Left side:  Lung sliding artifact   Present  Comet tail artifacts   Present      IMPRESSION:    No Pneumothorax.              Results for orders placed or performed during the hospital encounter of 07/20/24   XR Chest Port 1 View     Status: None    Narrative    EXAM: XR CHEST PORT 1 VIEW  LOCATION: [facility]  DATE: 7/20/2024    INDICATION: hypoxia, hemoptysis, drug overdose  COMPARISON: 4/10/2020      Impression    IMPRESSION: New, extensive airspace opacities throughout both lungs. This may be secondary to noncardiogenic pulmonary edema versus diffuse alveolar damage versus alveolar hemorrhage.. Cardiac silhouette size is within normal limits. The right   costophrenic angle was not included on the image.   POC US CHEST B-SCAN (PTX/Edema/PNA)     Status: None    Impression    Limited Bedside Cardiac Ultrasound, performed and interpreted by me.   Indication: Shortness of Breath.  Parasternal long axis and parasternal short axis views were acquired.   Image quality was satisfactory.    Findings:    There is no evidence of free fluid within the  pericardium.    IMPRESSION: Grossly normal left ventricular function and chamber size.  No large pericardial effusion..    =====    Limited Bedside Lung Ultrasound, performed and interpreted by me. Indication:    shortness of breath    Lung ultrasound was performed for the assessment of pneumothorax   The patient was placed in a semi recumbent position at approximately 45 degrees. The left and right lung apices were evaluated for evidence of pneumothorax.     Findings    Right side:  Lung sliding artifact   Present     Comet tail artifacts   Present     Left side:  Lung sliding artifact   Present     Comet tail artifacts   Present      IMPRESSION:    No Pneumothorax.       Comprehensive metabolic panel     Status: Abnormal   Result Value Ref Range    Sodium 141 135 - 145 mmol/L    Potassium 3.7 3.4 - 5.3 mmol/L    Carbon Dioxide (CO2) 26 22 - 29 mmol/L    Anion Gap 15 7 - 15 mmol/L    Urea Nitrogen 11.9 6.0 - 20.0 mg/dL    Creatinine 0.86 0.67 - 1.17 mg/dL    GFR Estimate >90 >60 mL/min/1.73m2    Calcium 9.7 8.8 - 10.4 mg/dL    Chloride 100 98 - 107 mmol/L    Glucose 115 (H) 70 - 99 mg/dL    Alkaline Phosphatase 85 40 - 150 U/L    AST 39 0 - 45 U/L    ALT 26 0 - 70 U/L    Protein Total 8.1 6.4 - 8.3 g/dL    Albumin 4.9 3.5 - 5.2 g/dL    Bilirubin Total 0.7 <=1.2 mg/dL   Alcohol level blood     Status: Normal   Result Value Ref Range    Alcohol ethyl <0.01 <=0.01 g/dL   Procalcitonin     Status: Normal   Result Value Ref Range    Procalcitonin 0.04 <0.50 ng/mL   INR     Status: Abnormal   Result Value Ref Range    INR 1.19 (H) 0.85 - 1.15   Partial thromboplastin time     Status: Normal   Result Value Ref Range    aPTT 29 22 - 38 Seconds   CBC with platelets and differential     Status: None   Result Value Ref Range    WBC Count 10.5 4.0 - 11.0 10e3/uL    RBC Count 5.02 4.40 - 5.90 10e6/uL    Hemoglobin 15.8 13.3 - 17.7 g/dL    Hematocrit 44.3 40.0 - 53.0 %    MCV 88 78 - 100 fL    MCH 31.5 26.5 - 33.0 pg    MCHC  35.7 31.5 - 36.5 g/dL    RDW 13.2 10.0 - 15.0 %    Platelet Count 222 150 - 450 10e3/uL    % Neutrophils 79 %    % Lymphocytes 16 %    % Monocytes 4 %    % Eosinophils 1 %    % Basophils 0 %    % Immature Granulocytes 0 %    NRBCs per 100 WBC 0 <1 /100    Absolute Neutrophils 8.2 1.6 - 8.3 10e3/uL    Absolute Lymphocytes 1.7 0.8 - 5.3 10e3/uL    Absolute Monocytes 0.4 0.0 - 1.3 10e3/uL    Absolute Eosinophils 0.1 0.0 - 0.7 10e3/uL    Absolute Basophils 0.0 0.0 - 0.2 10e3/uL    Absolute Immature Granulocytes 0.0 <=0.4 10e3/uL    Absolute NRBCs 0.0 10e3/uL   [place] Draw     Status: None    Narrative    The following orders were created for panel order [place] Draw.  Procedure                               Abnormality         Status                     ---------                               -----------         ------                     Extra Red Top Tube[[id number]]                               Final result                 Please view results for these tests on the individual orders.   Extra Red Top Tube     Status: None   Result Value Ref Range    Hold Specimen [facility]    Glucose by meter     Status: Abnormal   Result Value Ref Range    GLUCOSE BY METER POCT 119 (H) 70 - 99 mg/dL   [place] Draw     Status: None ()    Narrative    The following orders were created for panel order [place] Draw.  Procedure                               Abnormality         Status                     ---------                               -----------         ------                     Extra Purple Top Tube[[id number]]                                                         Please view results for these tests on the individual orders.   Blood gas arterial     Status: Abnormal   Result Value Ref Range    pH Arterial 7.36 7.35 - 7.45    pCO2 Arterial 45 35 - 45 mm Hg    pO2 Arterial 63 (L) 80 - 105 mm Hg    FIO2 75     Bicarbonate Arterial 25 21 - 28 mmol/L    Base Excess/Deficit Arterial -0.7 -3.0 - 3.0 mmol/L    Rahat's Test Yes      Oxyhemoglobin Arterial 89 (L) 92 - 100 %    O2 Sat, Arterial 91.0 (L) 96.0 - 97.0 %    Narrative    In healthy individuals, oxyhemoglobin (O2Hb) and oxygen saturation (SO2) are approximately equal. In the presence of dyshemoglobins, oxyhemoglobin can be considerably lower than oxygen saturation.   Adult Type and Screen     Status: None   Result Value Ref Range    ABO/RH(D) O POS     Antibody Screen Negative Negative    SPECIMEN EXPIRATION DATE [id number]    CBC with Platelets & Differential     Status: None    Narrative    The following orders were created for panel order CBC with Platelets & Differential.  Procedure                               Abnormality         Status                     ---------                               -----------         ------                     CBC with platelets and d...[[id number]]                      Final result                 Please view results for these tests on the individual orders.   ABO/Rh type and screen     Status: None    Narrative    The following orders were created for panel order ABO/Rh type and screen.  Procedure                               Abnormality         Status                     ---------                               -----------         ------                     Adult Type and Screen[[id number]]                            Final result                 Please view results for these tests on the individual orders.     Medications   naloxone (NARCAN) 2.5 mg in sodium chloride 0.9 % 250 mL infusion (has no administration in time range)   naloxone (NARCAN) injection 0.04 mg (0.04 mg Intravenous $Given 7/20/24 0711)   naloxone (NARCAN) injection 0.04 mg (0.04 mg Intravenous $Given 7/20/24 0731)   ondansetron (ZOFRAN) 2 MG/ML injection (4 mg  $Given 7/20/24 0754)   ondansetron (ZOFRAN) 2 MG/ML injection (4 mg  $Given 7/20/24 0754)     Labs Ordered and Resulted from Time of ED Arrival to Time of ED Departure   COMPREHENSIVE METABOLIC PANEL - Abnormal        Result Value    Sodium 141      Potassium 3.7      Carbon Dioxide (CO2) 26      Anion Gap 15      Urea Nitrogen 11.9      Creatinine 0.86      GFR Estimate >90      Calcium 9.7      Chloride 100      Glucose 115 (*)     Alkaline Phosphatase 85      AST 39      ALT 26      Protein Total 8.1      Albumin 4.9      Bilirubin Total 0.7     INR - Abnormal    INR 1.19 (*)    GLUCOSE BY METER - Abnormal    GLUCOSE BY METER POCT 119 (*)    BLOOD GAS ARTERIAL - Abnormal    pH Arterial 7.36      pCO2 Arterial 45      pO2 Arterial 63 (*)     FIO2 75      Bicarbonate Arterial 25      Base Excess/Deficit Arterial -0.7      Rahat's Test Yes      Oxyhemoglobin Arterial 89 (*)     O2 Sat, Arterial 91.0 (*)    ETHYL ALCOHOL LEVEL - Normal    Alcohol ethyl <0.01     PROCALCITONIN - Normal    Procalcitonin 0.04     PARTIAL THROMBOPLASTIN TIME - Normal    aPTT 29     CBC WITH PLATELETS AND DIFFERENTIAL    WBC Count 10.5      RBC Count 5.02      Hemoglobin 15.8      Hematocrit 44.3      MCV 88      MCH 31.5      MCHC 35.7      RDW 13.2      Platelet Count 222      % Neutrophils 79      % Lymphocytes 16      % Monocytes 4      % Eosinophils 1      % Basophils 0      % Immature Granulocytes 0      NRBCs per 100 WBC 0      Absolute Neutrophils 8.2      Absolute Lymphocytes 1.7      Absolute Monocytes 0.4      Absolute Eosinophils 0.1      Absolute Basophils 0.0      Absolute Immature Granulocytes 0.0      Absolute NRBCs 0.0     INFLUENZA A/B, RSV, & SARS-COV2 PCR   NT PROBNP INPATIENT   TROPONIN T, HIGH SENSITIVITY   TYPE AND SCREEN, ADULT    ABO/RH(D) O POS      Antibody Screen Negative      SPECIMEN EXPIRATION DATE [id number]     RESPIRATORY PANEL PCR   ABO/RH TYPE AND SCREEN     XR Chest Port 1 View   Final Result   IMPRESSION: New, extensive airspace opacities throughout both lungs. This may be secondary to noncardiogenic pulmonary edema versus diffuse alveolar damage versus alveolar hemorrhage.. Cardiac silhouette size is within  normal limits. The right    costophrenic angle was not included on the image.      POC US CHEST B-SCAN (PTX/Edema/PNA)   Final Result   Limited Bedside Cardiac Ultrasound, performed and interpreted by me.    Indication: Shortness of Breath.   Parasternal long axis and parasternal short axis views were acquired.    Image quality was satisfactory.      Findings:     There is no evidence of free fluid within the pericardium.      IMPRESSION: Grossly normal left ventricular function and chamber size.  No large pericardial effusion..      =====      Limited Bedside Lung Ultrasound, performed and interpreted by me. Indication:     shortness of breath      Lung ultrasound was performed for the assessment of pneumothorax    The patient was placed in a semi recumbent position at approximately 45 degrees. The left and right lung apices were evaluated for evidence of pneumothorax.       Findings     Right side:  Lung sliding artifact   Present      Comet tail artifacts   Present       Left side:  Lung sliding artifact   Present      Comet tail artifacts   Present         IMPRESSION:    No Pneumothorax.                   Critical Care Addendum  My initial assessment, based on my review of vital signs, focused history, physical exam, review of cardiac rhythm monitor, and discussion with ICU , established a high suspicion that Derik Croft has  pulmonary edema, hypoxia, potential hemoptysis from opioid overdose and Narcan given , which requires immediate intervention, and therefore he is critically ill.     After the initial assessment, the care team initiated medication therapy with IV Narcan  to provide stabilization care. Due to the critical nature of this patient, I reassessed vital signs, physical exam, review of cardiac rhythm monitor, mental status, and respiratory status multiple times prior to his disposition.     Time also spent performing documentation, reviewing test results, discussion with consultants, and  coordination of care.     Critical care time (excluding teaching time and procedures): 75 minutes.       Assessment & Plan    Opioid induced pulmonary edema with hypoxia.   Witnessed hemoptysis prior to my arrival at 7 AM   - Nasal BiPAP/high flow to main hypoxia, serial doses of IV Narcan, Narcan drip   -Review of portable chest x-ray, point-of-care ultrasound   - ICU admission, plus or minus intubation    7:45 AM -discussed with ICU HAILY who accepts patient for admission.      8 AM -after 0.04 mg of IV Narcan, pt more awake however vomited a small amount of pulmonary edema appearing fluid (foamy and pinkish).  8 mg of IV Zofran given.  Low-dose Narcan drip being started.  Maintaining oxygenation on high flow nasal cannula    I have reviewed the nursing notes. I have reviewed the findings, diagnosis, plan and need for follow up with the patient.    New Prescriptions    No medications on file       Final diagnoses:   Hypoxia   Acute respiratory distress   Opioid use disorder   Pulmonary alveolar hemorrhage       Steve Rangel MD  [facility] EMERGENCY DEPARTMENT  7/20/2024     Steve Rangel MD  07/20/24 0753

## 2024-07-20 NOTE — PROGRESS NOTES
Patient transported to [facility] ICU from ED.  Respiratory status maintained with Oxyplus Mask @ 15 LPM, additional interventions were not required.  Patient's vital signs were monitored continuously and remained stable throughout transport.Patient had frothy red emesis prior to leaving ED and again upon arrival to ICU. Returned to [facility] upon admission to ICU 30L 90%. SpO2 96-99.    Lily Soriano, RT, RRT--NPS  7/20/2024 9:01 AM

## 2024-07-21 ENCOUNTER — APPOINTMENT (OUTPATIENT)
Dept: GENERAL RADIOLOGY | Facility: CLINIC | Age: 24
End: 2024-07-21
Payer: COMMERCIAL

## 2024-07-21 LAB
AMPHETAMINES UR QL SCN: ABNORMAL
ANION GAP SERPL CALCULATED.3IONS-SCNC: 12 MMOL/L (ref 7–15)
BARBITURATES UR QL SCN: ABNORMAL
BENZODIAZ UR QL SCN: ABNORMAL
BUN SERPL-MCNC: 8.4 MG/DL (ref 6–20)
BZE UR QL SCN: ABNORMAL
CALCIUM SERPL-MCNC: 8.7 MG/DL (ref 8.8–10.4)
CANNABINOIDS UR QL SCN: ABNORMAL
CHLORIDE SERPL-SCNC: 98 MMOL/L (ref 98–107)
CREAT SERPL-MCNC: 0.69 MG/DL (ref 0.67–1.17)
CREAT UR-MCNC: 154 MG/DL
EGFRCR SERPLBLD CKD-EPI 2021: >90 ML/MIN/1.73M2
ERYTHROCYTE [DISTWIDTH] IN BLOOD BY AUTOMATED COUNT: 13 % (ref 10–15)
FENTANYL UR QL: ABNORMAL
GLUCOSE BLDC GLUCOMTR-MCNC: 149 MG/DL (ref 70–99)
GLUCOSE BLDC GLUCOMTR-MCNC: 78 MG/DL (ref 70–99)
GLUCOSE BLDC GLUCOMTR-MCNC: 80 MG/DL (ref 70–99)
GLUCOSE BLDC GLUCOMTR-MCNC: 94 MG/DL (ref 70–99)
GLUCOSE SERPL-MCNC: 82 MG/DL (ref 70–99)
HCO3 SERPL-SCNC: 25 MMOL/L (ref 22–29)
HCT VFR BLD AUTO: 37.9 % (ref 40–53)
HGB BLD-MCNC: 13.4 G/DL (ref 13.3–17.7)
MAGNESIUM SERPL-MCNC: 1.9 MG/DL (ref 1.7–2.3)
MCH RBC QN AUTO: 30.8 PG (ref 26.5–33)
MCHC RBC AUTO-ENTMCNC: 35.4 G/DL (ref 31.5–36.5)
MCV RBC AUTO: 87 FL (ref 78–100)
OPIATES UR QL SCN: ABNORMAL
PCP QUAL URINE (ROCHE): ABNORMAL
PHOSPHATE SERPL-MCNC: 2.2 MG/DL (ref 2.5–4.5)
PLATELET # BLD AUTO: 181 10E3/UL (ref 150–450)
POTASSIUM SERPL-SCNC: 3.8 MMOL/L (ref 3.4–5.3)
RBC # BLD AUTO: 4.35 10E6/UL (ref 4.4–5.9)
SODIUM SERPL-SCNC: 135 MMOL/L (ref 135–145)
WBC # BLD AUTO: 17.2 10E3/UL (ref 4–11)

## 2024-07-21 PROCEDURE — 85027 COMPLETE CBC AUTOMATED: CPT

## 2024-07-21 PROCEDURE — 99254 IP/OBS CNSLTJ NEW/EST MOD 60: CPT | Performed by: NURSE PRACTITIONER

## 2024-07-21 PROCEDURE — 120N000002 HC R&B MED SURG/OB UMMC

## 2024-07-21 PROCEDURE — 80367 DRUG SCREENING PROPOXYPHENE: CPT | Performed by: NURSE PRACTITIONER

## 2024-07-21 PROCEDURE — 250N000011 HC RX IP 250 OP 636

## 2024-07-21 PROCEDURE — 250N000013 HC RX MED GY IP 250 OP 250 PS 637

## 2024-07-21 PROCEDURE — 80349 CANNABINOIDS NATURAL: CPT | Performed by: NURSE PRACTITIONER

## 2024-07-21 PROCEDURE — 99233 SBSQ HOSP IP/OBS HIGH 50: CPT | Performed by: PHYSICIAN ASSISTANT

## 2024-07-21 PROCEDURE — 250N000013 HC RX MED GY IP 250 OP 250 PS 637: Performed by: SURGERY

## 2024-07-21 PROCEDURE — 999N000157 HC STATISTIC RCP TIME EA 10 MIN

## 2024-07-21 PROCEDURE — 5A09357 ASSISTANCE WITH RESPIRATORY VENTILATION, LESS THAN 24 CONSECUTIVE HOURS, CONTINUOUS POSITIVE AIRWAY PRESSURE: ICD-10-PCS | Performed by: SURGERY

## 2024-07-21 PROCEDURE — 250N000013 HC RX MED GY IP 250 OP 250 PS 637: Performed by: PHYSICIAN ASSISTANT

## 2024-07-21 PROCEDURE — 84100 ASSAY OF PHOSPHORUS: CPT

## 2024-07-21 PROCEDURE — 99231 SBSQ HOSP IP/OBS SF/LOW 25: CPT | Performed by: SURGERY

## 2024-07-21 PROCEDURE — 258N000003 HC RX IP 258 OP 636

## 2024-07-21 PROCEDURE — 83735 ASSAY OF MAGNESIUM: CPT

## 2024-07-21 PROCEDURE — 80048 BASIC METABOLIC PNL TOTAL CA: CPT

## 2024-07-21 PROCEDURE — 99418 PROLNG IP/OBS E/M EA 15 MIN: CPT | Performed by: PHYSICIAN ASSISTANT

## 2024-07-21 PROCEDURE — 80307 DRUG TEST PRSMV CHEM ANLYZR: CPT | Performed by: NURSE PRACTITIONER

## 2024-07-21 PROCEDURE — 94660 CPAP INITIATION&MGMT: CPT

## 2024-07-21 PROCEDURE — 250N000009 HC RX 250

## 2024-07-21 PROCEDURE — 80353 DRUG SCREENING COCAINE: CPT | Performed by: NURSE PRACTITIONER

## 2024-07-21 PROCEDURE — 71045 X-RAY EXAM CHEST 1 VIEW: CPT | Mod: 26 | Performed by: STUDENT IN AN ORGANIZED HEALTH CARE EDUCATION/TRAINING PROGRAM

## 2024-07-21 PROCEDURE — 71045 X-RAY EXAM CHEST 1 VIEW: CPT

## 2024-07-21 PROCEDURE — 250N000013 HC RX MED GY IP 250 OP 250 PS 637: Performed by: NURSE PRACTITIONER

## 2024-07-21 RX ORDER — POTASSIUM CHLORIDE 1500 MG/1
20 TABLET, EXTENDED RELEASE ORAL ONCE
Status: COMPLETED | OUTPATIENT
Start: 2024-07-21 | End: 2024-07-21

## 2024-07-21 RX ORDER — CLONIDINE HYDROCHLORIDE 0.1 MG/1
0.1 TABLET ORAL ONCE
Status: DISCONTINUED | OUTPATIENT
Start: 2024-07-21 | End: 2024-07-21

## 2024-07-21 RX ORDER — IPRATROPIUM BROMIDE AND ALBUTEROL SULFATE 2.5; .5 MG/3ML; MG/3ML
3 SOLUTION RESPIRATORY (INHALATION) EVERY 4 HOURS PRN
Status: DISCONTINUED | OUTPATIENT
Start: 2024-07-21 | End: 2024-07-22 | Stop reason: HOSPADM

## 2024-07-21 RX ORDER — LOPERAMIDE HCL 2 MG
2 CAPSULE ORAL EVERY 4 HOURS PRN
Status: DISCONTINUED | OUTPATIENT
Start: 2024-07-21 | End: 2024-07-22 | Stop reason: HOSPADM

## 2024-07-21 RX ORDER — BUPRENORPHINE AND NALOXONE 8; 2 MG/1; MG/1
1 FILM, SOLUBLE BUCCAL; SUBLINGUAL 2 TIMES DAILY
Qty: 14 FILM | Refills: 0 | Status: SHIPPED | OUTPATIENT
Start: 2024-07-21 | End: 2024-07-28

## 2024-07-21 RX ORDER — METHOCARBAMOL 750 MG/1
750 TABLET, FILM COATED ORAL ONCE
Status: DISCONTINUED | OUTPATIENT
Start: 2024-07-21 | End: 2024-07-21

## 2024-07-21 RX ORDER — IBUPROFEN 600 MG/1
600 TABLET, FILM COATED ORAL ONCE
Status: DISCONTINUED | OUTPATIENT
Start: 2024-07-21 | End: 2024-07-21

## 2024-07-21 RX ORDER — DICYCLOMINE HYDROCHLORIDE 10 MG/1
20 CAPSULE ORAL ONCE
Status: DISCONTINUED | OUTPATIENT
Start: 2024-07-21 | End: 2024-07-21

## 2024-07-21 RX ORDER — METHOCARBAMOL 500 MG/1
500 TABLET, FILM COATED ORAL 3 TIMES DAILY PRN
Status: DISCONTINUED | OUTPATIENT
Start: 2024-07-21 | End: 2024-07-22 | Stop reason: HOSPADM

## 2024-07-21 RX ORDER — GABAPENTIN 300 MG/1
300 CAPSULE ORAL ONCE
Status: DISCONTINUED | OUTPATIENT
Start: 2024-07-21 | End: 2024-07-21

## 2024-07-21 RX ORDER — CEFTRIAXONE 2 G/1
2 INJECTION, POWDER, FOR SOLUTION INTRAMUSCULAR; INTRAVENOUS EVERY 24 HOURS
Status: DISCONTINUED | OUTPATIENT
Start: 2024-07-21 | End: 2024-07-22 | Stop reason: HOSPADM

## 2024-07-21 RX ORDER — BUPRENORPHINE AND NALOXONE 8; 2 MG/1; MG/1
1 FILM, SOLUBLE BUCCAL; SUBLINGUAL 2 TIMES DAILY
Status: DISCONTINUED | OUTPATIENT
Start: 2024-07-21 | End: 2024-07-21

## 2024-07-21 RX ORDER — LOPERAMIDE HCL 2 MG
2 CAPSULE ORAL ONCE
Status: DISCONTINUED | OUTPATIENT
Start: 2024-07-21 | End: 2024-07-21

## 2024-07-21 RX ORDER — OLANZAPINE 5 MG/1
10 TABLET ORAL ONCE
Status: DISCONTINUED | OUTPATIENT
Start: 2024-07-21 | End: 2024-07-21

## 2024-07-21 RX ORDER — HYDROXYZINE HYDROCHLORIDE 25 MG/1
25 TABLET, FILM COATED ORAL EVERY 6 HOURS PRN
Status: DISCONTINUED | OUTPATIENT
Start: 2024-07-21 | End: 2024-07-22 | Stop reason: HOSPADM

## 2024-07-21 RX ORDER — QUETIAPINE FUMARATE 200 MG/1
200 TABLET, FILM COATED ORAL AT BEDTIME
Qty: 30 TABLET | Refills: 0 | Status: SHIPPED | OUTPATIENT
Start: 2024-07-21

## 2024-07-21 RX ORDER — PANTOPRAZOLE SODIUM 40 MG/1
40 TABLET, DELAYED RELEASE ORAL
Status: DISCONTINUED | OUTPATIENT
Start: 2024-07-21 | End: 2024-07-22 | Stop reason: HOSPADM

## 2024-07-21 RX ORDER — IBUPROFEN 600 MG/1
600 TABLET, FILM COATED ORAL EVERY 6 HOURS PRN
Status: DISCONTINUED | OUTPATIENT
Start: 2024-07-21 | End: 2024-07-22 | Stop reason: HOSPADM

## 2024-07-21 RX ORDER — DICYCLOMINE HYDROCHLORIDE 10 MG/1
20 CAPSULE ORAL 3 TIMES DAILY PRN
Status: DISCONTINUED | OUTPATIENT
Start: 2024-07-21 | End: 2024-07-22 | Stop reason: HOSPADM

## 2024-07-21 RX ORDER — BUPRENORPHINE AND NALOXONE 8; 2 MG/1; MG/1
1 FILM, SOLUBLE BUCCAL; SUBLINGUAL 2 TIMES DAILY
Status: DISCONTINUED | OUTPATIENT
Start: 2024-07-21 | End: 2024-07-22 | Stop reason: HOSPADM

## 2024-07-21 RX ORDER — CLONIDINE HYDROCHLORIDE 0.1 MG/1
0.1 TABLET ORAL EVERY 6 HOURS PRN
Status: DISCONTINUED | OUTPATIENT
Start: 2024-07-21 | End: 2024-07-22 | Stop reason: HOSPADM

## 2024-07-21 RX ORDER — MAGNESIUM OXIDE 400 MG/1
400 TABLET ORAL EVERY 4 HOURS
Status: COMPLETED | OUTPATIENT
Start: 2024-07-21 | End: 2024-07-21

## 2024-07-21 RX ORDER — AZITHROMYCIN 250 MG/1
500 TABLET, FILM COATED ORAL DAILY
Status: DISCONTINUED | OUTPATIENT
Start: 2024-07-21 | End: 2024-07-22 | Stop reason: HOSPADM

## 2024-07-21 RX ADMIN — PANTOPRAZOLE SODIUM 40 MG: 40 TABLET, DELAYED RELEASE ORAL at 18:00

## 2024-07-21 RX ADMIN — MAGNESIUM OXIDE TAB 400 MG (241.3 MG ELEMENTAL MG) 400 MG: 400 (241.3 MG) TAB at 07:48

## 2024-07-21 RX ADMIN — AZITHROMYCIN 500 MG: 500 TABLET, FILM COATED ORAL at 10:20

## 2024-07-21 RX ADMIN — PANTOPRAZOLE SODIUM 40 MG: 40 INJECTION, POWDER, FOR SOLUTION INTRAVENOUS at 07:48

## 2024-07-21 RX ADMIN — CEFTRIAXONE 2 G: 2 INJECTION, POWDER, FOR SOLUTION INTRAMUSCULAR; INTRAVENOUS at 10:22

## 2024-07-21 RX ADMIN — POTASSIUM CHLORIDE 20 MEQ: 1500 TABLET, EXTENDED RELEASE ORAL at 07:48

## 2024-07-21 RX ADMIN — NALOXONE HYDROCHLORIDE 0.3 MG/HR: 1 INJECTION PARENTERAL at 01:24

## 2024-07-21 RX ADMIN — SODIUM PHOSPHATE, MONOBASIC, MONOHYDRATE AND SODIUM PHOSPHATE, DIBASIC, ANHYDROUS 15 MMOL: 142; 276 INJECTION, SOLUTION INTRAVENOUS at 07:47

## 2024-07-21 RX ADMIN — QUETIAPINE FUMARATE 200 MG: 100 TABLET ORAL at 21:31

## 2024-07-21 RX ADMIN — NALOXONE HYDROCHLORIDE 0.3 MG/HR: 1 INJECTION PARENTERAL at 08:15

## 2024-07-21 RX ADMIN — Medication 10 MG: at 19:10

## 2024-07-21 RX ADMIN — BUPRENORPHINE AND NALOXONE 1 FILM: 8; 2 FILM BUCCAL; SUBLINGUAL at 14:16

## 2024-07-21 RX ADMIN — MAGNESIUM OXIDE TAB 400 MG (241.3 MG ELEMENTAL MG) 400 MG: 400 (241.3 MG) TAB at 10:20

## 2024-07-21 ASSESSMENT — ACTIVITIES OF DAILY LIVING (ADL)
ADLS_ACUITY_SCORE: 42
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
## 2024-07-21 NOTE — PROGRESS NOTES
[facility]    ICU Accept Note - Hospitalist Service, GOLD TEAM        Date of Admission:  7/20/2024    Assessment & Plan   Derik Croft is a 24 year old male with a history of opiate use disorder, ADHD, depression with SI, and anxiety admitted to the ICU on 7/20/2024 following accidental opiate overdose (found [place] bus [place], required rescue breathing and Narcan via EMS). Hospital course was complicated by acute hypoxic respiratory failure, aspiration PNA, hematemesis, lactic acidosis, tachycardia, and stress induced hyperglycemia. Transferring out of the ICU on 7/21/2024.     Active issues:   # Acute Hypoxic Respiratory Failure likely 2/2 Inhalation Injury from Fentanyl: Admit CXR 7/20 with new extensive airspace opacities throughout both lungs (non-cardiogenic pulmonary edema vs diffuse alveolar damage vs alveolar hemorrhage). Required HFNC to maintain sats. Weaned to RA today.  - Supplemental O2 PRN to maintain sats > 92%  - Low threshold for diuretics if worsening respiratory sx    # Possible Aspiration Pneumonia: CXR 7/21 with slight increase in left perihilar consolidation compared to 7/20, slight decrease in peripheral opacities, and persistent bilateral airspace opacities with perihilar predominance. WBC newly elevated to 17.2 on 7/21. Findings felt 2/2 aspiration PNA and started on Azithromycin/Rocephin 7/21 by ICU.  - Continue PO Azithromycin 500 mg daily x 3 days  - Continue IV Rocephin 2 g q 24 hours. Can likely narrow to PO abx in coming days if clinically improving.    # Opiate Overdose  # Opiate Withdrawal in setting of Opiate Use Disorder  Found down at bus stop, apneic upon EMS arrival requiring rescue breathing via bag-valve mask. Required several doses of Narcan and Narcan infusion through AM of 7/21. Utox + for amphetamines, fentanyl, cocaine, cannabinoids. Reports smoking 0.1 g Fentanyl daily and meth (unknown quantity) x 3-4 days PTA.  Evaluated by Psych 7/21.  - Resume PTA Suboxone 8-2 mg BID (1 week supply for discharge ordered by Psychiatry)  - Monitor opiate withdrawal score w/PRN Bentyl, Clonidine, Hydroxyzine, Ibuprofen, Imodium for sx (do not anticipate any withdrawal sx since resuming Suboxone).  - Not interested in IP CD treatment. Plans to return to [facility] after discharge.    # Hemoptysis vs Hematemesis, Resolved: Occurred in ED. ED documentation reports hemoptysis, while ICU notes state hematemesis, pt states it was both. Hgb wnl, though has downtrended since admission (15.8 --> 13.4 today). Suspect 2/2 inhalation lung injury. Resolved.  - Change from IV to PO Protonix BID  - Trend Hgb in AM. Sooner if recurrent bleeding.  - Monitor for s/sx of bleeding.    # Mild Tachycardia likely 2/2 Opioid Withdrawal and Hypoxia: Improving. Initial HRs in 120s, now 80s to low 100s.   - Treatment of withdrawal and hypoxia as above.  - Monitor.    # Stress Induced Hyperglycemia: BG peak of 149. Has not required insulin.  - Monitor.    # Depression, Anxiety, ADHD: Evaluated by Psych 7/21. Denies SI.  - Resume PTA Seroquel 200 mg q HS  - Increase Melatonin to 10 mg q HS for sleep while hospitalized (does not take at home)    #Hypocalcemia: iCa 4.3 on admit. Received 1 g Calcium Gluconate 7/20. Serum Ca++ 8.7 (L) today, previously wnl.  - Trend in AM.     #Hypophosphatemia: Phos 2.2 today.  - Replace per RN protocol. Trend in AM.    ICU Transfer Checklist    YES NO Links/Additional comments   Current meds & orders reviewed & updated? [x] [] Transfer Navigator   O2 requirements appropriate for accepting floor? [x] [] O2 Device: None (Room air)   Oxygen Delivery: 2 LPM  FiO2 (%): 55 %   Appropriate antibiotics ordered? [x] [] Fever Report  30 Day Micro   Appropriate fluids ordered? [x] []    Appropriate diet ordered? [x] [] Regular Diet Adult   Telemetry indicated/ordered?    [x] [] Cardiac Monitoring: ACTIVE order. Indication: ICU     Appropriate  "DVT prophy ordered? [x] [] Anticoag/VTE   Sagastume indicated/ordered?    [] [x] Not present   Central lines indicated? [] [x] LDA Avatar      PT/OT indicated/ordered? [] [x] D/C Planner  [facility]   Code status reviewed? [x] [] Full Code   Preferred contact on file? [x] []      Clinically Significant Risk Factors     # Hypocalcemia: Lowest iCa = 4.3 mg/dL in last 2 days, will monitor and replace as appropriate               # Obesity: Estimated body mass index is 31.57 kg/m  as calculated from the following:    Height as of this encounter: 1.778 m (5' 10\").    Weight as of this encounter: 99.8 kg (220 lb)., PRESENT ON ADMISSION            Disposition Plan   Medically Ready for Discharge: Anticipated Tomorrow    DONNELL Chery  Hospitalist Service, [facility]  Securely message with [email or web address] (more info)  Text page via [facility] Paging/Directory   See signed in provider for up to date coverage information  ______________________________________________________________________    Interval History   Weaned to RA this morning. Patient sleeping comfortably in bed. He reports occasional cough productive of small amount of mucus. Endorses SOB when lying on stomach. Last BM earlier today (7/21). Denies chest pain, nausea, vomiting, or abdominal pain.    He states he was smoking 0.1 grams of Fentanyl daily for 3-4 days prior to admission. He also reports meth use during this time, but is unable to specify amount of use. He does not wish to pursue inpatient chemical dependency treatment after discharge and states he already attends outpatient treatment at [facility].    Physical Exam   Vital Signs: Temp: 98.4  F (36.9  C) Temp src: Oral BP: 134/75 Pulse: 101   Resp: 25 SpO2: 98 % O2 Device: None (Room air) Oxygen Delivery: 2 LPM  Weight: 220 lbs 0 oz    General: Lying in bed. Somnolent, but arouses to voice. Non-toxic appearing. NAD.  HEENT: Anicteric sclera. MMM.  CV: " RRR  Respiratory: Normal effort on RA. Lungs CTA anterolaterally.  GI: Abdomen is soft, non-tender, and non-distended. Bowel sounds present.  Extremities: No peripheral edema. Warm and well perfused.  Neuro: Answers questions appropriately. Moves all extremities.  Skin: No visible rashes or jaundice.    65 MINUTES SPENT BY ME on the date of service doing chart review, history, exam, documentation & further activities per the note.      Data   Unresulted Labs Ordered in the Past 30 Days of this Admission       Date and Time Order Name Status Description    7/21/2024 10:44 AM Urine Drug Confirmation Panel In process     7/21/2024 10:44 AM THC Confirmation Quantitative Urine In process             I have personally reviewed the following data over the past 24 hrs:    17.2 (H)  \   13.4   / 181     135 98 8.4 /  149 (H)   3.8 25 0.69 \     Procal: N/A CRP: N/A Lactic Acid: 1.5         Imaging results reviewed over the past 24 hrs:   Recent Results (from the past 24 hour(s))   XR Chest Port 1 View    Narrative    Exam: XR CHEST PORT 1 VIEW, 7/21/2024 7:40 AM    Indication: Eval for inhalation injury    Comparison: Chest radiograph 7/20/2024    Findings:   Single portable AP upright view of the chest was obtained. Stable  cardiomediastinal silhouette. Clear costophrenic angles. No  pneumothorax. Redemonstration of perihilar predominant airspace  opacities, with increased left perihilar confluence. No pleural  effusion. No acute osseous abnormality.      Impression    Impression: Bilateral airspace opacities with perihilar predominance,  slight increased confluence in the left perihilar location and slight  decrease in peripheral opacities compared to prior x-ray.    I have personally reviewed the examination and initial interpretation  and I agree with the findings.    HOLLY KNIGHT MD         SYSTEM ID:  [id number]

## 2024-07-21 NOTE — PROGRESS NOTES
ICU Attending Note    I have seen and examined Derik Croft, reviewed the patient's history, pertinent labs, vital signs, medications, physical exam, and radiographs.  The patient is critically ill by my examination and requires continued ICU monitoring and cares    Derik Croft is admitted to ICU after being found down. Given narcan at the scene and responded. Admitted to smoking fentanyl. In ED had an O2 requirement and CXR evidence for noncardiac pulmonary edema, or acute lung injury.  Has a history of opioid use disorder, ADHD, depression, anxiety and suicide ideation.  .    Recent Events:  Admitted to ICU kept on high flow oxygen. Required narcan qtt over the day.    Exam:  Temp:  [97.6  F (36.4  C)-99.7  F (37.6  C)] 98.6  F (37  C)  Pulse:  [] 95  Resp:  [23-52] 28  BP: (102-143)/() 132/84  FiO2 (%):  [25 %-75 %] 55 %  SpO2:  [91 %-100 %] 100 %  @I/O last 3 completed shifts:  In: 4076.08 [P.O.:500; I.V.:2576.08; IV Piggyback:1000]  Out: 3280 [Urine:3180; Emesis/NG output:100]  FiO2 (%): 55 %  Resp: 28    Lungs clear  Cor rrr  Abd  soft      Results:  ABG   Recent Labs   Lab 07/20/24  0732   PH 7.36   PCO2 45   PO2 63*   HCO3 25     CBC  Recent Labs   Lab 07/21/24  0529 07/20/24  1631 07/20/24  1356 07/20/24  0939 07/20/24  0630   WBC 17.2*  --   --  7.3 10.5   HGB 13.4 15.6 14.1 15.7 15.8   HCT 37.9*  --   --  45.0 44.3     --   --  168 222     BMP  Recent Labs   Lab 07/21/24  0755 07/21/24  0529 07/21/24  0332 07/20/24  2346 07/20/24  1630 07/20/24  0939 07/20/24  0858 07/20/24  0630   NA  --  135  --   --   --  140  --  141   POTASSIUM  --  3.8  --   --   --  3.6  --  3.7   CHLORIDE  --  98  --   --   --  105  --  100   CO2  --  25  --   --   --  23  --  26   BUN  --  8.4  --   --   --  11.8  --  11.9   CR  --  0.69  --   --   --  0.74  --  0.86   GLC 78 82 94 95   < > 80   < > 115*    < > = values in this interval not displayed.     LFT  Recent Labs   Lab 07/20/24  6768  07/20/24  0630   AST 35 39   ALT 20 26   ALKPHOS 72 85   BILITOTAL 1.0 0.7   ALBUMIN 4.3 4.9   INR 1.13 1.19*     PancreasNo lab results found in last 7 days.  INR  Lab Results   Component Value Date    INR 1.13 07/20/2024    INR 1.19 07/20/2024       Current Issues:  Neuro/Psych  Opioid use disorder  Opioid overdose  Wean narcan to off.  PTA meds restarted.   SW, psych, chem dep as needed.    Pulmonary  Acute hypoxic respiratory failure, probably secondary to inhalation injury from smoking fentanyl.  Repeat CXR/  Wean O2      CV  Tachycardia, likely due to opioid withdrawal, hyoxia.  Follow    GI  Hematemesis.  No further episode.  Protonix.    Endo  Hyperglycemia, stress, follow    Improved, if weans off narcan and O2, can move to the floor.    Hussein Elizondo MD  Acute Care Surgery/Critical Care    Addendum  CXR shows decreased opacification of right lung field with a more concentrated opacity in left.  May represent aspiration. WBC elevated.  Start empiric ceftriaxone for aspiration pneumonia.      See psych note.  No suicidal.  Restart seroquel and suboxone once narcan is off.

## 2024-07-21 NOTE — PROGRESS NOTES
Goal Outcome Evaluation     ICU End of Shift Summary:     General:   Neuro: A&Ox4, follows commands  Pulm/Resp: room air, clear lung sounds  CV: normotensive, SR  GI: regular diet, no hematemesis this shift, BM x1  : good UOP, continent  Access: PIV x2  Skin: no skin issues  Gtts: TKO      Plan:   Narcan off @ 1300  Abx for potential PNA  If sober home refuses patient, pt will go home to [facility] house  Started on suboxone

## 2024-07-21 NOTE — PLAN OF CARE
Problem: Gas Exchange Impaired  Goal: Optimal Gas Exchange  Intervention: Optimize Oxygenation and Ventilation  Recent Flowsheet Documentation  Taken 7/21/2024 0000 by Neelam Curry RN  Head of Bed (HOB) Positioning: HOB at 30 degrees  Taken 7/20/2024 2200 by Neelam Curry RN  Head of Bed (HOB) Positioning: HOB at 30 degrees  Taken 7/20/2024 2000 by Neelam Curry RN  Head of Bed (HOB) Positioning: HOB at 30 degrees   Goal Outcome Evaluation: Progressing                      10A ICU End of Shift Summary.     Changes this shift: Tachypneic at 40s-50s, HAILY aware. Switched to BiPAP from High Flow NC at 0100. PRN Neb for wheezing or SOB ordered. Around 0244, patient refused to keep BiPAP on, switched back to High Flow NC. Labs collected. For Mag, Phos, and Potassium replacement.     Neuro: A/O x 4. Drowsy at times, able to follow commands.   Cardiac: SR to ST at 100s.   Respiratory: LS diminished. On HFNC, FiO2 55% and 40 LPM.   GI/: No BM this shift. Voids spontaneously, uses urinal. Denied nausea, no vomiting noted.   Diet/appetite: Clear liquid diet.   Pain: Denied pain this shift.   Skin: No new concerns.   LDA's: PIV x 3.   Activities: Changes position independently.     Drips:   Narcan at 0.3 mg/hr.   LR at 10 ml/hr.        Plan: Wean FiO2 as tolerated.  Monitor respiratory status. Still for CXR.     For complete vital signs and assessments, please refer to flowsheets.

## 2024-07-22 VITALS
TEMPERATURE: 99.3 F | DIASTOLIC BLOOD PRESSURE: 63 MMHG | HEART RATE: 102 BPM | SYSTOLIC BLOOD PRESSURE: 130 MMHG | HEIGHT: 70 IN | WEIGHT: 220 LBS | OXYGEN SATURATION: 91 % | RESPIRATION RATE: 18 BRPM | BODY MASS INDEX: 31.5 KG/M2

## 2024-07-22 LAB
ANION GAP SERPL CALCULATED.3IONS-SCNC: 13 MMOL/L (ref 7–15)
BUN SERPL-MCNC: 8 MG/DL (ref 6–20)
CALCIUM SERPL-MCNC: 8.8 MG/DL (ref 8.8–10.4)
CHLORIDE SERPL-SCNC: 100 MMOL/L (ref 98–107)
CREAT SERPL-MCNC: 0.81 MG/DL (ref 0.67–1.17)
EGFRCR SERPLBLD CKD-EPI 2021: >90 ML/MIN/1.73M2
ERYTHROCYTE [DISTWIDTH] IN BLOOD BY AUTOMATED COUNT: 13.2 % (ref 10–15)
GLUCOSE BLDC GLUCOMTR-MCNC: 86 MG/DL (ref 70–99)
GLUCOSE BLDC GLUCOMTR-MCNC: 90 MG/DL (ref 70–99)
GLUCOSE SERPL-MCNC: 85 MG/DL (ref 70–99)
HCO3 SERPL-SCNC: 25 MMOL/L (ref 22–29)
HCT VFR BLD AUTO: 34.1 % (ref 40–53)
HGB BLD-MCNC: 11.9 G/DL (ref 13.3–17.7)
MAGNESIUM SERPL-MCNC: 2 MG/DL (ref 1.7–2.3)
MCH RBC QN AUTO: 31.3 PG (ref 26.5–33)
MCHC RBC AUTO-ENTMCNC: 34.9 G/DL (ref 31.5–36.5)
MCV RBC AUTO: 90 FL (ref 78–100)
PHOSPHATE SERPL-MCNC: 1.9 MG/DL (ref 2.5–4.5)
PLATELET # BLD AUTO: 165 10E3/UL (ref 150–450)
POTASSIUM SERPL-SCNC: 3.2 MMOL/L (ref 3.4–5.3)
RBC # BLD AUTO: 3.8 10E6/UL (ref 4.4–5.9)
SODIUM SERPL-SCNC: 138 MMOL/L (ref 135–145)
WBC # BLD AUTO: 12.8 10E3/UL (ref 4–11)

## 2024-07-22 PROCEDURE — 250N000013 HC RX MED GY IP 250 OP 250 PS 637: Performed by: STUDENT IN AN ORGANIZED HEALTH CARE EDUCATION/TRAINING PROGRAM

## 2024-07-22 PROCEDURE — 36415 COLL VENOUS BLD VENIPUNCTURE: CPT

## 2024-07-22 PROCEDURE — 250N000013 HC RX MED GY IP 250 OP 250 PS 637: Performed by: PHYSICIAN ASSISTANT

## 2024-07-22 PROCEDURE — 82374 ASSAY BLOOD CARBON DIOXIDE: CPT

## 2024-07-22 PROCEDURE — 84100 ASSAY OF PHOSPHORUS: CPT | Performed by: NURSE PRACTITIONER

## 2024-07-22 PROCEDURE — 250N000013 HC RX MED GY IP 250 OP 250 PS 637

## 2024-07-22 PROCEDURE — 250N000011 HC RX IP 250 OP 636

## 2024-07-22 PROCEDURE — 83735 ASSAY OF MAGNESIUM: CPT | Performed by: NURSE PRACTITIONER

## 2024-07-22 PROCEDURE — 250N000013 HC RX MED GY IP 250 OP 250 PS 637: Performed by: SURGERY

## 2024-07-22 PROCEDURE — 85027 COMPLETE CBC AUTOMATED: CPT

## 2024-07-22 PROCEDURE — 99239 HOSP IP/OBS DSCHRG MGMT >30: CPT | Performed by: STUDENT IN AN ORGANIZED HEALTH CARE EDUCATION/TRAINING PROGRAM

## 2024-07-22 PROCEDURE — 258N000003 HC RX IP 258 OP 636: Performed by: STUDENT IN AN ORGANIZED HEALTH CARE EDUCATION/TRAINING PROGRAM

## 2024-07-22 RX ORDER — AZITHROMYCIN 500 MG/1
500 TABLET, FILM COATED ORAL DAILY
Qty: 2 TABLET | Refills: 0 | Status: SHIPPED | OUTPATIENT
Start: 2024-07-23 | End: 2024-07-25

## 2024-07-22 RX ORDER — SODIUM CHLORIDE 9 MG/ML
INJECTION, SOLUTION INTRAVENOUS
Status: COMPLETED
Start: 2024-07-22 | End: 2024-07-22

## 2024-07-22 RX ORDER — MAGNESIUM OXIDE 400 MG/1
400 TABLET ORAL EVERY 4 HOURS
Status: DISCONTINUED | OUTPATIENT
Start: 2024-07-22 | End: 2024-07-22 | Stop reason: HOSPADM

## 2024-07-22 RX ORDER — POTASSIUM CHLORIDE 1500 MG/1
40 TABLET, EXTENDED RELEASE ORAL ONCE
Status: COMPLETED | OUTPATIENT
Start: 2024-07-22 | End: 2024-07-22

## 2024-07-22 RX ORDER — CEFUROXIME AXETIL 500 MG/1
500 TABLET ORAL 2 TIMES DAILY
Qty: 6 TABLET | Refills: 0 | Status: SHIPPED | OUTPATIENT
Start: 2024-07-23 | End: 2024-07-26

## 2024-07-22 RX ADMIN — SODIUM CHLORIDE 500 ML: 9 INJECTION, SOLUTION INTRAVENOUS at 10:39

## 2024-07-22 RX ADMIN — POTASSIUM CHLORIDE 40 MEQ: 1500 TABLET, EXTENDED RELEASE ORAL at 10:39

## 2024-07-22 RX ADMIN — MAGNESIUM OXIDE TAB 400 MG (241.3 MG ELEMENTAL MG) 400 MG: 400 (241.3 MG) TAB at 10:38

## 2024-07-22 RX ADMIN — AZITHROMYCIN 500 MG: 500 TABLET, FILM COATED ORAL at 07:56

## 2024-07-22 RX ADMIN — PANTOPRAZOLE SODIUM 40 MG: 40 TABLET, DELAYED RELEASE ORAL at 07:56

## 2024-07-22 RX ADMIN — BUPRENORPHINE AND NALOXONE 1 FILM: 8; 2 FILM BUCCAL; SUBLINGUAL at 13:38

## 2024-07-22 RX ADMIN — POTASSIUM & SODIUM PHOSPHATES POWDER PACK 280-160-250 MG 2 PACKET: 280-160-250 PACK at 10:39

## 2024-07-22 RX ADMIN — CEFTRIAXONE 2 G: 2 INJECTION, POWDER, FOR SOLUTION INTRAMUSCULAR; INTRAVENOUS at 10:43

## 2024-07-22 ASSESSMENT — ACTIVITIES OF DAILY LIVING (ADL)
ADLS_ACUITY_SCORE: 42
ADLS_ACUITY_SCORE: 44
ADLS_ACUITY_SCORE: 42
ADLS_ACUITY_SCORE: 44
ADLS_ACUITY_SCORE: 42
ADLS_ACUITY_SCORE: 44

## 2024-07-22 NOTE — PROGRESS NOTES
"Brief Care Management Note:    Received notification from bedside RN that pt is requesting a discharge ride be arranged through his insurance.  Provided RN w/ form that instructs pt on how to arrange this transportation.  Provided [facility] subscriber ID number on form.  RN to pass along to pt.  Care mgmt to be notified in the event pt encounters challenges in arranging transport, so that further assistance may be provided.        DEANNA Sanches  [facility]  Units 8M/S & 10 ICU  Reachable on [employer] - Search by name or search \"RNCC\"  Phone: [phone]    "

## 2024-07-22 NOTE — PLAN OF CARE
Goal Outcome Evaluation:    Shift 2300 - 0700:  VS: Temp: 98.5  F (36.9  C) Temp src: Oral BP: 122/71 Pulse: 102   Resp: 20 SpO2: 100 % O2 Device: None (Room air) Oxygen Delivery: 2 LPM    O2: Stable On RA. Denies chest pain  and SOB.    Output: Cont of B&B.    Last BM: 7/21/2024.    Activity: Independent in room.   Skin: Intact   Pain: Pt denies pain.   Neuro: A&Ox4. Denies numbness and tingling.   Dressing: None   Diet: Regular diet.   LDA: 2 PIV, left hand and one in R upper forearm.   Equipment: Call light within reach, Personal items.   Plan: Continue POC, cardiac monitoring   Additional Info: Expected discharge today

## 2024-07-22 NOTE — DISCHARGE SUMMARY
"[facility]  Hospitalist Discharge Summary      Date of Admission:  7/20/2024  Date of Discharge:  7/22/2024  Discharging Provider: Abhishek Malave DO  Discharge Service: Hospitalist Service, GOLD TEAM 21    Discharge Diagnoses     # Acute Hypoxic Respiratory Failure likely 2/2 Inhalation Injury from Fentanyl  Acute Noncardiac Pulmonary Edema   # Possible Aspiration Pneumonia  # Opiate Overdose  # Opiate Withdrawal in setting of Opiate Use Disorder  # Hemoptysis vs Hematemesis, Resolved  # Mild Tachycardia likely 2/2 Opioid Withdrawal and Hypoxia  # Stress Induced Hyperglycemia  # Depression, Anxiety, ADHD  #Hypocalcemia  #Hypophosphatemia  Hypokalemia     Clinically Significant Risk Factors     # Obesity: Estimated body mass index is 31.57 kg/m  as calculated from the following:    Height as of this encounter: 1.778 m (5' 10\").    Weight as of this encounter: 99.8 kg (220 lb).       Follow-ups Needed After Discharge   Follow-up Appointments     Follow Up and recommended labs and tests      Please keep your previously scheduled appointments            Unresulted Labs Ordered in the Past 30 Days of this Admission       Date and Time Order Name Status Description    7/21/2024 10:44 AM Urine Drug Confirmation Panel In process     7/21/2024 10:44 AM THC Confirmation Quantitative Urine In process         These results will be followed up by Hospitalist pool    Discharge Disposition   Discharged to home  Condition at discharge: Stable    Hospital Course     Derik Croft is a 24 year old male with a history of opiate use disorder, ADHD, depression with SI, and anxiety admitted to the ICU on 7/20/2024 following accidental opiate overdose ([place], required rescue breathing and Narcan via EMS). Hospital course was complicated by acute hypoxic respiratory failure, aspiration PNA, hematemesis, lactic acidosis, tachycardia, and stress induced hyperglycemia. Transferring " out of the ICU on 7/21/2024. Patient remained stable overnight off of Narcan infusion and was restarted on his home suboxone without issues. Patient felt well enough to discharge.    # Acute Hypoxic Respiratory Failure likely 2/2 Inhalation Injury from Fentanyl:   Acute Noncardiac Pulmonary Edema   Admit CXR 7/20 with new extensive airspace opacities throughout both lungs (non-cardiogenic pulmonary edema vs diffuse alveolar damage vs alveolar hemorrhage). Required HFNC to maintain sats. Weaned to RA today.     # Possible Aspiration Pneumonia: CXR 7/21 with slight increase in left perihilar consolidation compared to 7/20, slight decrease in peripheral opacities, and persistent bilateral airspace opacities with perihilar predominance. WBC newly elevated to 17.2 on 7/21. Findings felt 2/2 aspiration PNA and started on Azithromycin/Rocephin 7/21 by ICU.  - Continue PO Azithromycin 500 mg daily x 3 days  - Switched to cefuroxime to finish 3 more days      # Opiate Overdose  # Opiate Withdrawal in setting of Opiate Use Disorder  Found down at bus stop, apneic upon EMS arrival requiring rescue breathing via bag-valve mask. Required several doses of Narcan and Narcan infusion through AM of 7/21. Utox + for amphetamines, fentanyl, cocaine, cannabinoids. Reports smoking 0.1 g Fentanyl daily and meth (unknown quantity) x 3-4 days PTA. Evaluated by Psych 7/21.  - Resume PTA Suboxone 8-2 mg BID (1 week supply for discharge ordered by Psychiatry)  - Not interested in IP CD treatment. Plans to [facility] discharge.     # Hemoptysis vs Hematemesis, Resolved: Occurred in ED. ED documentation reports hemoptysis, while ICU notes state hematemesis, pt states it was both. Hgb wnl, though has downtrended since admission (15.8 --> 13.4 today). Suspect 2/2 inhalation lung injury. Resolved.     # Mild Tachycardia likely 2/2 Opioid Withdrawal and Hypoxia: Improving. Initial HRs in 120s, now 80s to low 100s.   - Treatment  of withdrawal and hypoxia as above.     # Stress Induced Hyperglycemia: BG peak of 149. Has not required insulin.  - Monitor.     # Depression, Anxiety, ADHD: Evaluated by Psych 7/21. Denies SI.  - Resume PTA Seroquel 200 mg q HS     #Hypocalcemia: iCa 4.3 on admit. Received 1 g Calcium Gluconate 7/20. Serum Ca++ 8.7 (L) today, previously wnl.     #Hypophosphatemia: Phos 2.2 today.  - Replace per RN protocol. Trend in AM.    Hypokalemia   Likely due to poor po intake   - replace as needed        Consultations This Hospital Stay   CARE MANAGEMENT / SOCIAL WORK IP CONSULT  PSYCHIATRY IP CONSULT    Code Status   Full Code    Time Spent on this Encounter   IAbhishek DO, personally saw the patient today and spent greater than 30 minutes discharging this patient.       Abhishek Malave DO  [facility] [address]  [facility] [address]  Phone: [phone]  Fax: [phone]  ______________________________________________________________________    Physical Exam   Vital Signs: Temp: 98.1  F (36.7  C) Temp src: Oral BP: 130/63 Pulse: 102   Resp: 18 SpO2: 91 % O2 Device: None (Room air)    Weight: 220 lbs 0 oz  General Appearance: In bed no distress, alert and oriented   Respiratory: breathing comfortably on room air   Cardiovascular: tachycardic, regular rhythm   GI: non distended   Skin: no rashes or lesions   Other: Non focal neuro exam         Primary Care Physician   Abdulaziz Rodriguez MD    Discharge Orders      Reason for your hospital stay    You were in the hospital due to an opiate overdose. This was treated with an infusion of narcan and then you were restarted on suboxone. You are also being treated for a pneumonia.     Activity    Your activity upon discharge: activity as tolerated     Follow Up and recommended labs and tests    Please keep your previously scheduled appointments     Diet    Follow this diet upon discharge: Orders Placed This Encounter      Regular Diet Adult       Significant  Results and Procedures   Most Recent 3 CBC's:  Recent Labs   Lab Test 07/22/24  0748 07/21/24  0529 07/20/24  1631 07/20/24  1356 07/20/24  0939   WBC 12.8* 17.2*  --   --  7.3   HGB 11.9* 13.4 15.6   < > 15.7   MCV 90 87  --   --  89    181  --   --  168    < > = values in this interval not displayed.     Most Recent 3 BMP's:  Recent Labs   Lab Test 07/22/24  0748 07/22/24  0743 07/21/24  1608 07/21/24  0755 07/21/24  0529 07/20/24  1630 07/20/24  0939     --   --   --  135  --  140   POTASSIUM 3.2*  --   --   --  3.8  --  3.6   CHLORIDE 100  --   --   --  98  --  105   CO2 25  --   --   --  25  --  23   BUN 8.0  --   --   --  8.4  --  11.8   CR 0.81  --   --   --  0.69  --  0.74   ANIONGAP 13  --   --   --  12  --  12   JOCELIN 8.8  --   --   --  8.7*  --  8.9   GLC 85 86 80   < > 82   < > 80    < > = values in this interval not displayed.     Most Recent 2 LFT's:  Recent Labs   Lab Test 07/20/24  0939 07/20/24  0630   AST 35 39   ALT 20 26   ALKPHOS 72 85   BILITOTAL 1.0 0.7     Most Recent 3 INR's:  Recent Labs   Lab Test 07/20/24  0939 07/20/24  0630   INR 1.13 1.19*   ,   Results for orders placed or performed during the hospital encounter of 07/20/24   XR Chest Port 1 View    Narrative    EXAM: XR CHEST PORT 1 VIEW  LOCATION: [facility]  DATE: 7/20/2024    INDICATION: hypoxia, hemoptysis, drug overdose  COMPARISON: 4/10/2020      Impression    IMPRESSION: New, extensive airspace opacities throughout both lungs. This may be secondary to noncardiogenic pulmonary edema versus diffuse alveolar damage versus alveolar hemorrhage.. Cardiac silhouette size is within normal limits. The right   costophrenic angle was not included on the image.   POC US CHEST B-SCAN (PTX/Edema/PNA)    Impression    Limited Bedside Cardiac Ultrasound, performed and interpreted by me.   Indication: Shortness of Breath.  Parasternal long axis and parasternal short axis views were  acquired.   Image quality was satisfactory.    Findings:    There is no evidence of free fluid within the pericardium.    IMPRESSION: Grossly normal left ventricular function and chamber size.  No large pericardial effusion..    =====    Limited Bedside Lung Ultrasound, performed and interpreted by me. Indication:    shortness of breath    Lung ultrasound was performed for the assessment of pneumothorax   The patient was placed in a semi recumbent position at approximately 45 degrees. The left and right lung apices were evaluated for evidence of pneumothorax.     Findings    Right side:  Lung sliding artifact   Present     Comet tail artifacts   Present     Left side:  Lung sliding artifact   Present     Comet tail artifacts   Present      IMPRESSION:    No Pneumothorax.       XR Chest Port 1 View    Narrative    Exam: XR CHEST PORT 1 VIEW, 7/21/2024 7:40 AM    Indication: Eval for inhalation injury    Comparison: Chest radiograph 7/20/2024    Findings:   Single portable AP upright view of the chest was obtained. Stable  cardiomediastinal silhouette. Clear costophrenic angles. No  pneumothorax. Redemonstration of perihilar predominant airspace  opacities, with increased left perihilar confluence. No pleural  effusion. No acute osseous abnormality.      Impression    Impression: Bilateral airspace opacities with perihilar predominance,  slight increased confluence in the left perihilar location and slight  decrease in peripheral opacities compared to prior x-ray.    I have personally reviewed the examination and initial interpretation  and I agree with the findings.    HOLLY KNIGHT MD         SYSTEM ID:  [id number]       Discharge Medications   Current Discharge Medication List        START taking these medications    Details   azithromycin (ZITHROMAX) 500 MG tablet Take 1 tablet (500 mg) by mouth daily for 2 days  Qty: 2 tablet, Refills: 0    Associated Diagnoses: Community acquired pneumonia, unspecified  laterality      cefuroxime (CEFTIN) 500 MG tablet Take 1 tablet (500 mg) by mouth 2 times daily for 3 days  Qty: 6 tablet, Refills: 0    Associated Diagnoses: Community acquired pneumonia, unspecified laterality           CONTINUE these medications which have CHANGED    Details   buprenorphine HCl-naloxone HCl (SUBOXONE) 8-2 MG per film Place 1 Film under the tongue 2 times daily for 7 days  Qty: 14 Film, Refills: 0    Associated Diagnoses: Opioid use disorder      QUEtiapine (SEROQUEL) 200 MG tablet Take 1 tablet (200 mg) by mouth or Feeding Tube at bedtime  Qty: 30 tablet, Refills: 0    Associated Diagnoses: Opioid use disorder; Mood disorder in conditions classified elsewhere           CONTINUE these medications which have NOT CHANGED    Details   naloxone (NARCAN) 4 MG/0.1ML nasal spray Spray 4 mg into one nostril alternating nostrils once as needed for opioid reversal           Allergies   No Known Allergies

## 2024-07-22 NOTE — PROGRESS NOTES
Care Management Follow Up    Length of Stay (days): 2    Expected Discharge Date: 07/22/2024     Concerns to be Addressed:       Patient plan of care discussed at interdisciplinary rounds: Yes    Anticipated Discharge Disposition: OP TX      Anticipated Discharge Services:  OP TX   Anticipated Discharge DME:  [facility]    Patient/family educated on Medicare website which has current facility and service quality ratings: NA   Education Provided on the Discharge Plan:  Yes  Patient/Family in Agreement with the Plan:  Yes    Referrals Placed by CM/SW:    Private pay costs discussed: Not applicable    Additional Information:  Pt receives OP services for his medications and substance use treatment.  RNCC providing Pt with information on scheduling discharge ride. Per Behavioral health staff note Pt is declining additional mental health support referrals. No additional SW needs at  this time.     Jose Ball, [facility] CenterSW  [facility]   Ph: [phone]

## 2024-07-23 ENCOUNTER — PATIENT OUTREACH (OUTPATIENT)
Dept: FAMILY MEDICINE | Facility: CLINIC | Age: 24
End: 2024-07-23
Payer: COMMERCIAL

## 2024-07-23 LAB
AMPHET UR CFM-MCNC: 1520 NG/ML
AMPHET/CREAT UR: 987 NG/MG {CREAT}
BUPRENORPHINE UR CFM-MCNC: 18 NG/ML
BUPRENORPHINE/CREAT UR: 12 NG/MG {CREAT}
BZE UR CFM-MCNC: 2380 NG/ML
BZE/CREAT UR: 1545 NG/MG {CREAT}
FENTANYL UR CFM-MCNC: 94 NG/ML
FENTANYL/CREAT UR: 61 NG/MG {CREAT}
METHAMPHET UR CFM-MCNC: 3540 NG/ML
METHAMPHET/CREAT UR: 2299 NG/MG {CREAT}
NALOXONE UR CFM-MCNC: 1273 NG/ML
NALOXONE: 827 NG/MG {CREAT}
NORBUPRENORPHINE UR CFM-MCNC: ABNORMAL NG/ML
NORFENTANYL UR CFM-MCNC: 312 NG/ML
NORFENTANYL/CREAT UR: 203 NG/MG {CREAT}

## 2024-07-23 NOTE — TELEPHONE ENCOUNTER
Writer called patient and left message to return call to clinic.     If patient returns call please route to nurse queue to complete TCM hospital follow up questionnaire, medication reconciliation and assist with scheduling a hospital follow up visit..    BECCA Jacobo, RN  [facility]

## 2024-07-24 LAB
CANNABINOIDS UR CFM-MCNC: 1257 NG/ML
CARBOXYTHC/CREAT UR: 816 NG/MG CREAT

## 2024-07-24 NOTE — TELEPHONE ENCOUNTER
Leon Sousa RN called Derik on 7/24 and left a message to return call to clinic. If patient calls back, please route to [facility].     TC please route to RN.    If patient returns call please complete Post Discharge Assessment, Med Reconciliation, and offer to schedule Hospital follow up.     Made the two required attempts, closing encounter.      Leon Sousa RN  [facility]

## 2024-11-17 ENCOUNTER — HEALTH MAINTENANCE LETTER (OUTPATIENT)
Age: 24
End: 2024-11-17

## 2024-12-17 ENCOUNTER — HOSPITAL ENCOUNTER (EMERGENCY)
Facility: CLINIC | Age: 24
Discharge: HOME | End: 2024-12-17
Attending: EMERGENCY MEDICINE | Admitting: EMERGENCY MEDICINE
Payer: COMMERCIAL

## 2024-12-17 VITALS
SYSTOLIC BLOOD PRESSURE: 131 MMHG | TEMPERATURE: 98 F | HEIGHT: 70 IN | DIASTOLIC BLOOD PRESSURE: 79 MMHG | OXYGEN SATURATION: 98 % | HEART RATE: 65 BPM | RESPIRATION RATE: 16 BRPM | BODY MASS INDEX: 28.63 KG/M2 | WEIGHT: 200 LBS

## 2024-12-17 PROCEDURE — 99281 EMR DPT VST MAYX REQ PHY/QHP: CPT

## 2024-12-17 ASSESSMENT — COLUMBIA-SUICIDE SEVERITY RATING SCALE - C-SSRS
6. HAVE YOU EVER DONE ANYTHING, STARTED TO DO ANYTHING, OR PREPARED TO DO ANYTHING TO END YOUR LIFE?: NO
1. IN THE PAST MONTH, HAVE YOU WISHED YOU WERE DEAD OR WISHED YOU COULD GO TO SLEEP AND NOT WAKE UP?: NO
2. HAVE YOU ACTUALLY HAD ANY THOUGHTS OF KILLING YOURSELF IN THE PAST MONTH?: NO

## 2024-12-18 NOTE — ED TRIAGE NOTES
"Reports generalized abd pain with n/v and fevers x 3 days, poor appetite, \"feeling weak\"  Denies any urinary symptoms       Triage Assessment (Adult)       Row Name 12/17/24 1942          Triage Assessment    Airway WDL WDL        Respiratory WDL    Respiratory WDL WDL        Skin Circulation/Temperature WDL    Skin Circulation/Temperature WDL WDL        Cardiac WDL    Cardiac WDL WDL        Peripheral/Neurovascular WDL    Peripheral Neurovascular WDL WDL        Cognitive/Neuro/Behavioral WDL    Cognitive/Neuro/Behavioral WDL X;level of consciousness     Level of Consciousness lethargic     Arousal Level arouses to voice     Orientation oriented x 4        Errol Coma Scale    Best Eye Response 3-->(E3) to speech     Best Motor Response 6-->(M6) obeys commands     Best Verbal Response 5-->(V5) oriented     Errol Coma Scale Score 14                     "

## 2025-08-13 ENCOUNTER — OFFICE VISIT (OUTPATIENT)
Dept: URGENT CARE | Facility: URGENT CARE | Age: 25
End: 2025-08-13
Payer: COMMERCIAL

## 2025-08-13 VITALS
OXYGEN SATURATION: 98 % | HEART RATE: 71 BPM | BODY MASS INDEX: 27.98 KG/M2 | DIASTOLIC BLOOD PRESSURE: 62 MMHG | WEIGHT: 195 LBS | TEMPERATURE: 98.2 F | RESPIRATION RATE: 16 BRPM | SYSTOLIC BLOOD PRESSURE: 93 MMHG

## 2025-08-13 DIAGNOSIS — S50.822A: Primary | ICD-10-CM

## 2025-08-13 PROCEDURE — 99212 OFFICE O/P EST SF 10 MIN: CPT

## 2025-08-13 PROCEDURE — 3074F SYST BP LT 130 MM HG: CPT

## 2025-08-13 PROCEDURE — 3078F DIAST BP <80 MM HG: CPT

## 2025-09-01 ENCOUNTER — TELEPHONE (OUTPATIENT)
Dept: FAMILY MEDICINE | Facility: CLINIC | Age: 25
End: 2025-09-01
Payer: COMMERCIAL